# Patient Record
Sex: MALE | Race: WHITE | Employment: OTHER | URBAN - METROPOLITAN AREA
[De-identification: names, ages, dates, MRNs, and addresses within clinical notes are randomized per-mention and may not be internally consistent; named-entity substitution may affect disease eponyms.]

---

## 2017-01-23 PROBLEM — Z82.49 FAMILY HX OF HYPERTENSION: Status: ACTIVE | Noted: 2017-01-23

## 2017-02-07 PROBLEM — R07.89 ATYPICAL CHEST PAIN: Status: ACTIVE | Noted: 2017-02-07

## 2017-02-21 PROBLEM — G58.8 PERIPHERAL NEUROPATHY DUE TO ISCHEMIA: Status: ACTIVE | Noted: 2017-02-21

## 2017-02-21 PROBLEM — G62.89 PERIPHERAL NEUROPATHY DUE TO ISCHEMIA: Status: ACTIVE | Noted: 2017-02-21

## 2018-10-02 ENCOUNTER — HOSPITAL ENCOUNTER (EMERGENCY)
Age: 38
Discharge: HOME | End: 2018-10-02
Payer: COMMERCIAL

## 2018-10-02 ENCOUNTER — APPOINTMENT (OUTPATIENT)
Dept: GENERAL RADIOLOGY | Age: 38
End: 2018-10-02
Payer: COMMERCIAL

## 2018-10-02 VITALS
BODY MASS INDEX: 22.75 KG/M2 | TEMPERATURE: 98.7 F | HEIGHT: 72 IN | WEIGHT: 168 LBS | OXYGEN SATURATION: 98 % | RESPIRATION RATE: 16 BRPM | SYSTOLIC BLOOD PRESSURE: 160 MMHG | DIASTOLIC BLOOD PRESSURE: 70 MMHG | HEART RATE: 76 BPM

## 2018-10-02 DIAGNOSIS — J20.9 ACUTE BRONCHITIS, UNSPECIFIED ORGANISM: Primary | ICD-10-CM

## 2018-10-02 PROCEDURE — 71046 X-RAY EXAM CHEST 2 VIEWS: CPT

## 2018-10-02 PROCEDURE — 99283 EMERGENCY DEPT VISIT LOW MDM: CPT

## 2018-10-02 RX ORDER — GUAIFENESIN AND DEXTROMETHORPHAN HYDROBROMIDE 1200; 60 MG/1; MG/1
1 TABLET, EXTENDED RELEASE ORAL EVERY 12 HOURS PRN
Qty: 14 TABLET | Refills: 0 | Status: SHIPPED | OUTPATIENT
Start: 2018-10-02 | End: 2018-10-09

## 2018-10-02 RX ORDER — METHYLPREDNISOLONE 4 MG/1
TABLET ORAL
Qty: 21 TABLET | Status: SHIPPED | OUTPATIENT
Start: 2018-10-02 | End: 2018-10-08

## 2018-10-02 RX ORDER — ALBUTEROL SULFATE 90 UG/1
2 AEROSOL, METERED RESPIRATORY (INHALATION) EVERY 4 HOURS PRN
Qty: 1 INHALER | Refills: 1 | Status: SHIPPED | OUTPATIENT
Start: 2018-10-02 | End: 2018-10-11

## 2018-10-02 RX ORDER — PSEUDOEPHEDRINE HYDROCHLORIDE 30 MG/1
30 TABLET ORAL EVERY 4 HOURS PRN
Qty: 42 TABLET | Refills: 0 | Status: SHIPPED | OUTPATIENT
Start: 2018-10-02 | End: 2018-10-09

## 2018-10-02 RX ORDER — BENZONATATE 100 MG/1
100 CAPSULE ORAL 3 TIMES DAILY PRN
Qty: 21 CAPSULE | Refills: 0 | Status: SHIPPED | OUTPATIENT
Start: 2018-10-02 | End: 2018-10-09

## 2018-10-02 RX ORDER — GUAIFENESIN 400 MG/1
400 TABLET ORAL 4 TIMES DAILY PRN
COMMUNITY
End: 2018-10-02 | Stop reason: DRUGHIGH

## 2018-10-02 ASSESSMENT — PAIN DESCRIPTION - PAIN TYPE: TYPE: ACUTE PAIN

## 2018-10-02 ASSESSMENT — PAIN DESCRIPTION - DESCRIPTORS: DESCRIPTORS: ACHING

## 2018-10-02 NOTE — ED PROVIDER NOTES
[facility]     Department of Emergency Medicine   ED  Provider Note  Admit Date/RoomTime: 10/2/2018 11:21 AM  ED Room: 17/17   Chief Complaint   URI (X 1 WEEK)    History of Present Illness   Source of history provided by:  patient. History/Exam Limitations: none. Becky Altman is a 41 y.o. old male who has a past medical history of:   Past Medical History:   Diagnosis Date    Arm pain     arm pain and numbness    Hepatitis     Neck pain     Osteoarthritis of spine with radiculopathy, cervical region 3/17/2016    Shoulder pain       presents to the emergency department by private vehicle, with complaints of gradual onset, still present, constant productive cough with sputum described as yellow and green which began 1 week(s) prior to arrival.  The symptoms are associated with chills, night sweats, nasal congestion, sore throat (from coughing) and cough and denies abdominal pain, calf pain, dizziness, leg swelling, palpitations, shortness of breath or syncope. He has prior history of 1 prior episode of pneumonia in the past.  Since onset the symptoms have been persistent and stable and moderate to severe in severity. The symptoms are aggravated by being outside at work and relieved by nothing. ROS   Pertinent positives and negatives are stated within HPI, all other systems reviewed and are negative. History reviewed. No pertinent surgical history. Social History:  reports that he has been smoking Cigarettes. He has been smoking about 1.00 pack per day. He uses smokeless tobacco. He reports that he drinks alcohol. He reports that he does not use drugs. Family History: family history includes Diabetes in his mother; Heart Disease in his father; High Blood Pressure in his mother. Allergies: Patient has no known allergies.     Physical Exam           ED Triage Vitals [10/02/18 1117]   BP Temp Temp Source Pulse Resp SpO2 Height Weight   (!) 160/70 98.7 °F (37.1 °C) Oral -- 16 -- 6' (1.829 m) 168 lb (76.2 kg)      Oxygen Saturation Interpretation: Normal.    Constitutional:  Alert, development consistent with age. HEENT:  NC/NT. Airway patent. Neck:  Normal ROM. Supple. Respiratory:  Breath sounds: equal bilaterally. Lung sounds: normal.   CV:  Regular rate and rhythm, normal heart sounds, without pathological murmurs, ectopy, gallops, or rubs. .  GI:  Abdomen Soft, nontender, good bowel sounds. No firm or pulsatile mass. Integument:  Normal turgor. Warm, dry, without visible rash. Lymphatic:  Edema:  none Bilateral lower extremity(s). Neurological:  Oriented. Motor functions intact. Lab / Imaging Results   (All laboratory and radiology results have been personally reviewed by myself)  Labs:  No results found for this visit on 10/02/18. Imaging: All Radiology results interpreted by Radiologist unless otherwise noted. XR CHEST STANDARD (2 VW)   Final Result   1. No acute cardiopulmonary disease. ED Course / Medical Decision Making   Medications - No data to display     Consult(s):   None    Procedure(s):   none    Medical Decision Making:    Based on low  suspicion for pneumonia as per history/physical findings, imaging was done and confirms the above findings. Upper respiratory infection likely viral in etiology. Not hypoxic, nothing to suggest pneumonia. Patient is well appearing, non toxic and appropriate for outpatient management. Plan is for symptom management with PCP follow up. Plan of Care: All questions answered. Explained the rationale for symptomatic treatment rather than use of an antibiotic. Instruction provided in the use of fluids, vaporizer, acetaminophen, and other OTC medication for symptom control. Extra fluids  Analgesics as needed, dose reviewed. Follow up as needed should symptoms fail to improve. Counseling:     The emergency provider has spoken with the patient and discussed todays results, in addition to providing specific details for the plan of care and counseling regarding the diagnosis and prognosis. Questions are answered at this time and they are agreeable with the plan. Assessment      1. Acute bronchitis, unspecified organism      Plan   Discharge to home  Patient condition is good    New Medications     New Prescriptions    ALBUTEROL SULFATE HFA (PROVENTIL HFA) 108 (90 BASE) MCG/ACT INHALER    Inhale 2 puffs into the lungs every 4 hours as needed for Wheezing    BENZONATATE (TESSALON PERLES) 100 MG CAPSULE    Take 1 capsule by mouth 3 times daily as needed for Cough    DEXTROMETHORPHAN-GUAIFENESIN (MUCINEX DM MAXIMUM STRENGTH)  MG TB12    Take 1 tablet by mouth every 12 hours as needed (cough and congestion)    METHYLPREDNISOLONE (MEDROL, MARSHALL,) 4 MG TABLET    Take as directed on package insert days 1-6    PSEUDOEPHEDRINE (DECONGESTANT) 30 MG TABLET    Take 1 tablet by mouth every 4 hours as needed for Congestion     Electronically signed by David Johnson PA-C   DD: 10/2/18  **This report was transcribed using voice recognition software. Every effort was made to ensure accuracy; however, inadvertent computerized transcription errors may be present.   END OF ED PROVIDER NOTE        David Johnson PA-C  10/02/18 [id number]

## 2018-10-11 ENCOUNTER — OFFICE VISIT (OUTPATIENT)
Dept: FAMILY MEDICINE CLINIC | Age: 38
End: 2018-10-11
Payer: COMMERCIAL

## 2018-10-11 ENCOUNTER — HOSPITAL ENCOUNTER (OUTPATIENT)
Age: 38
Discharge: HOME | End: 2018-10-13
Payer: COMMERCIAL

## 2018-10-11 VITALS
WEIGHT: 162 LBS | OXYGEN SATURATION: 99 % | RESPIRATION RATE: 14 BRPM | HEART RATE: 76 BPM | BODY MASS INDEX: 21.94 KG/M2 | SYSTOLIC BLOOD PRESSURE: 122 MMHG | TEMPERATURE: 98.7 F | HEIGHT: 72 IN | DIASTOLIC BLOOD PRESSURE: 64 MMHG

## 2018-10-11 DIAGNOSIS — J20.9 ACUTE BRONCHITIS, UNSPECIFIED ORGANISM: ICD-10-CM

## 2018-10-11 DIAGNOSIS — Z13.31 DEPRESSION SCREENING NEGATIVE: ICD-10-CM

## 2018-10-11 DIAGNOSIS — R63.4 ABNORMAL WEIGHT LOSS: ICD-10-CM

## 2018-10-11 DIAGNOSIS — J20.9 ACUTE BRONCHITIS, UNSPECIFIED ORGANISM: Primary | ICD-10-CM

## 2018-10-11 DIAGNOSIS — R53.83 FATIGUE, UNSPECIFIED TYPE: ICD-10-CM

## 2018-10-11 LAB
ALBUMIN SERPL-MCNC: 4.4 G/DL (ref 3.5–5.2)
ALP BLD-CCNC: 67 U/L (ref 40–129)
ALT SERPL-CCNC: 17 U/L (ref 0–40)
ANION GAP SERPL CALCULATED.3IONS-SCNC: 18 MMOL/L (ref 7–16)
AST SERPL-CCNC: 19 U/L (ref 0–39)
BASOPHILS ABSOLUTE: 0.12 E9/L (ref 0–0.2)
BASOPHILS RELATIVE PERCENT: 1.2 % (ref 0–2)
BILIRUB SERPL-MCNC: 0.4 MG/DL (ref 0–1.2)
BUN BLDV-MCNC: 19 MG/DL (ref 6–20)
CALCIUM SERPL-MCNC: 9.3 MG/DL (ref 8.6–10.2)
CHLORIDE BLD-SCNC: 97 MMOL/L (ref 98–107)
CHOLESTEROL, TOTAL: 123 MG/DL (ref 0–199)
CO2: 23 MMOL/L (ref 22–29)
CREAT SERPL-MCNC: 0.8 MG/DL (ref 0.7–1.2)
EOSINOPHILS ABSOLUTE: 0.17 E9/L (ref 0.05–0.5)
EOSINOPHILS RELATIVE PERCENT: 1.8 % (ref 0–6)
GFR AFRICAN AMERICAN: >60
GFR NON-AFRICAN AMERICAN: >60 ML/MIN/1.73
GLUCOSE BLD-MCNC: 71 MG/DL (ref 74–109)
HCT VFR BLD CALC: 47.2 % (ref 37–54)
HDLC SERPL-MCNC: 32 MG/DL
HEMOGLOBIN: 15.1 G/DL (ref 12.5–16.5)
IMMATURE GRANULOCYTES #: 0.04 E9/L
IMMATURE GRANULOCYTES %: 0.4 % (ref 0–5)
LDL CHOLESTEROL CALCULATED: 78 MG/DL (ref 0–99)
LYMPHOCYTES ABSOLUTE: 1.91 E9/L (ref 1.5–4)
LYMPHOCYTES RELATIVE PERCENT: 19.9 % (ref 20–42)
MCH RBC QN AUTO: 29.1 PG (ref 26–35)
MCHC RBC AUTO-ENTMCNC: 32 % (ref 32–34.5)
MCV RBC AUTO: 90.9 FL (ref 80–99.9)
MONOCYTES ABSOLUTE: 0.7 E9/L (ref 0.1–0.95)
MONOCYTES RELATIVE PERCENT: 7.3 % (ref 2–12)
NEUTROPHILS ABSOLUTE: 6.68 E9/L (ref 1.8–7.3)
NEUTROPHILS RELATIVE PERCENT: 69.4 % (ref 43–80)
PDW BLD-RTO: 12.8 FL (ref 11.5–15)
PLATELET # BLD: 369 E9/L (ref 130–450)
PMV BLD AUTO: 10.5 FL (ref 7–12)
POTASSIUM SERPL-SCNC: 4.4 MMOL/L (ref 3.5–5)
RBC # BLD: 5.19 E12/L (ref 3.8–5.8)
SODIUM BLD-SCNC: 138 MMOL/L (ref 132–146)
T4 FREE: 1.34 NG/DL (ref 0.93–1.7)
TOTAL PROTEIN: 7.5 G/DL (ref 6.4–8.3)
TRIGL SERPL-MCNC: 64 MG/DL (ref 0–149)
TSH SERPL DL<=0.05 MIU/L-ACNC: 4.91 UIU/ML (ref 0.27–4.2)
VITAMIN D 25-HYDROXY: 55 NG/ML (ref 30–100)
VLDLC SERPL CALC-MCNC: 13 MG/DL
WBC # BLD: 9.6 E9/L (ref 4.5–11.5)

## 2018-10-11 PROCEDURE — G8484 FLU IMMUNIZE NO ADMIN: HCPCS | Performed by: NURSE PRACTITIONER

## 2018-10-11 PROCEDURE — 99214 OFFICE O/P EST MOD 30 MIN: CPT | Performed by: NURSE PRACTITIONER

## 2018-10-11 PROCEDURE — 85025 COMPLETE CBC W/AUTO DIFF WBC: CPT

## 2018-10-11 PROCEDURE — 84443 ASSAY THYROID STIM HORMONE: CPT

## 2018-10-11 PROCEDURE — 80061 LIPID PANEL: CPT

## 2018-10-11 PROCEDURE — G8427 DOCREV CUR MEDS BY ELIG CLIN: HCPCS | Performed by: NURSE PRACTITIONER

## 2018-10-11 PROCEDURE — 4004F PT TOBACCO SCREEN RCVD TLK: CPT | Performed by: NURSE PRACTITIONER

## 2018-10-11 PROCEDURE — 82306 VITAMIN D 25 HYDROXY: CPT

## 2018-10-11 PROCEDURE — G8420 CALC BMI NORM PARAMETERS: HCPCS | Performed by: NURSE PRACTITIONER

## 2018-10-11 PROCEDURE — 80053 COMPREHEN METABOLIC PANEL: CPT

## 2018-10-11 PROCEDURE — 84439 ASSAY OF FREE THYROXINE: CPT

## 2018-10-11 ASSESSMENT — ENCOUNTER SYMPTOMS
DIARRHEA: 0
VOICE CHANGE: 0
NAUSEA: 0
VOMITING: 0
SHORTNESS OF BREATH: 0
COLOR CHANGE: 0
CONSTIPATION: 0
SINUS PRESSURE: 0
ABDOMINAL PAIN: 0
FACIAL SWELLING: 0
CHEST TIGHTNESS: 0
SORE THROAT: 1
BACK PAIN: 0
RHINORRHEA: 0
SINUS PAIN: 0
TROUBLE SWALLOWING: 0
WHEEZING: 0
COUGH: 1

## 2018-10-11 ASSESSMENT — PATIENT HEALTH QUESTIONNAIRE - PHQ9
2. FEELING DOWN, DEPRESSED OR HOPELESS: 0
SUM OF ALL RESPONSES TO PHQ QUESTIONS 1-9: 0
SUM OF ALL RESPONSES TO PHQ QUESTIONS 1-9: 0
1. LITTLE INTEREST OR PLEASURE IN DOING THINGS: 0
SUM OF ALL RESPONSES TO PHQ9 QUESTIONS 1 & 2: 0

## 2018-10-11 NOTE — PROGRESS NOTES
immunocompromised state. Neurological: Negative for dizziness, tremors, seizures, syncope, facial asymmetry, speech difficulty, weakness, light-headedness, numbness and headaches. Hematological: Negative for adenopathy. Does not bruise/bleed easily. Psychiatric/Behavioral: Negative for agitation, behavioral problems, confusion, decreased concentration, dysphoric mood, hallucinations, self-injury, sleep disturbance and suicidal ideas. The patient is not nervous/anxious and is not hyperactive. OBJECTIVE:     VS:  Wt Readings from Last 3 Encounters:   10/11/18 162 lb (73.5 kg)   10/02/18 168 lb (76.2 kg)   08/17/17 172 lb (78 kg)                       Vitals:    10/11/18 0816   BP: 122/64   Pulse: 76   Resp: 14   Temp: 98.7 °F (37.1 °C)   TempSrc: Temporal   SpO2: 99%   Weight: 162 lb (73.5 kg)   Height: 6' (1.829 m)       General: Alert and oriented to person, place, and time, well developed and well nourished, in no acute distress  SKIN: Warm and dry, intact without any rash, masses or lesions, multiple tattoes  HEAD: normocephalic, atraumatic  Eyes: sclera/conjunctiva clear, PERRLA, EOMI's intact  ENT: tympanic membranes, external ear and ear canal normal bilaterally, normal hearing, Nose without deformity, nasal mucosa and turbinates normal without polyps   Throat: clear, tongue midline, tonsils 1+, drainage, no masses or lesions noted, good dentition  Neck: supple and non-tender without mass, trachea midline, no cervical lymphadenopathy, no bruit, no thyromegaly or nodules  Cardiovascular: regular rate and regular rhythm, normal S1 and S2,  no murmurs, rubs, clicks, or gallop. Distal pulses intact, no carotid bruits.  No edema  Pulmonary/Chest: clear to auscultation bilaterally, no wheezes, rales or rhonchi, normal air movement, no respiratory distress  Abdomen: soft, non-tender, non-distended, normal bowel sounds, no masses or hepatosplenomegaly  Musculoskeletal: Normal ROM, no joint swelling, deformity or tenderness   Neurologic: reflexes normal and symmetric, no cranial nerve deficit, gait, coordination and speech normal  Extremities: no clubbing, cyanosis, or edema. Psychiatric: Good eye contact, normal mood and affect, answers questions appropriately    ASSESSMENT/PLAN   Deuce Leary was seen today for cough, chest congestion, fatigue, weight loss, stress and other. Diagnoses and all orders for this visit:    Acute bronchitis, unspecified organism stable  -     CBC Auto Differential; Future  -     Comprehensive Metabolic Panel; Future    Fatigue, unspecified type New  -     CBC Auto Differential; Future  -     Comprehensive Metabolic Panel; Future  -     Lipid Panel; Future  -     Vitamin D 25 Hydrox, D2 & D3; Future  -     TSH without Reflex; Future  -     T4, Free; Future    Abnormal weight loss New  -     CBC Auto Differential; Future  -     Comprehensive Metabolic Panel; Future  -     TSH without Reflex; Future  -     T4, Free; Future    Depression screening negative            Return in about 1 week (around 10/18/2018) for fatigue, weight loss. Discussed reducing caffeine intake, Discussed smoking cessation, Discussed exercising 30 minutes daily and Discussed taking medications as directed and adverse effects        I have reviewed my findings and recommendations with Noah Fu.     Virginia Clifford, NP-C, FNP-BC

## 2020-02-25 ENCOUNTER — HOSPITAL ENCOUNTER (EMERGENCY)
Age: 40
Discharge: HOME | End: 2020-02-25

## 2020-02-25 ENCOUNTER — APPOINTMENT (OUTPATIENT)
Dept: GENERAL RADIOLOGY | Age: 40
End: 2020-02-25

## 2020-02-25 VITALS
BODY MASS INDEX: 21.67 KG/M2 | SYSTOLIC BLOOD PRESSURE: 148 MMHG | RESPIRATION RATE: 20 BRPM | WEIGHT: 160 LBS | OXYGEN SATURATION: 100 % | HEART RATE: 79 BPM | TEMPERATURE: 98.8 F | HEIGHT: 72 IN | DIASTOLIC BLOOD PRESSURE: 83 MMHG

## 2020-02-25 LAB
ANION GAP SERPL CALCULATED.3IONS-SCNC: 12 MMOL/L (ref 7–16)
BASOPHILS ABSOLUTE: 0.12 E9/L (ref 0–0.2)
BASOPHILS RELATIVE PERCENT: 1.5 % (ref 0–2)
BILIRUBIN URINE: NEGATIVE
BLOOD, URINE: NEGATIVE
BUN BLDV-MCNC: 17 MG/DL (ref 6–20)
CALCIUM SERPL-MCNC: 9.8 MG/DL (ref 8.6–10.2)
CHLORIDE BLD-SCNC: 100 MMOL/L (ref 98–107)
CLARITY: CLEAR
CO2: 30 MMOL/L (ref 22–29)
COLOR: YELLOW
CREAT SERPL-MCNC: 0.8 MG/DL (ref 0.7–1.2)
D DIMER: <200 NG/ML DDU
EOSINOPHILS ABSOLUTE: 0.22 E9/L (ref 0.05–0.5)
EOSINOPHILS RELATIVE PERCENT: 2.7 % (ref 0–6)
GFR AFRICAN AMERICAN: >60
GFR NON-AFRICAN AMERICAN: >60 ML/MIN/1.73
GLUCOSE BLD-MCNC: 91 MG/DL (ref 74–99)
GLUCOSE URINE: NEGATIVE MG/DL
HCT VFR BLD CALC: 46.1 % (ref 37–54)
HEMOGLOBIN: 15.9 G/DL (ref 12.5–16.5)
IMMATURE GRANULOCYTES #: 0.03 E9/L
IMMATURE GRANULOCYTES %: 0.4 % (ref 0–5)
KETONES, URINE: NEGATIVE MG/DL
LEUKOCYTE ESTERASE, URINE: NEGATIVE
LYMPHOCYTES ABSOLUTE: 2.07 E9/L (ref 1.5–4)
LYMPHOCYTES RELATIVE PERCENT: 25.8 % (ref 20–42)
MCH RBC QN AUTO: 30.8 PG (ref 26–35)
MCHC RBC AUTO-ENTMCNC: 34.5 % (ref 32–34.5)
MCV RBC AUTO: 89.3 FL (ref 80–99.9)
MONOCYTES ABSOLUTE: 0.48 E9/L (ref 0.1–0.95)
MONOCYTES RELATIVE PERCENT: 6 % (ref 2–12)
NEUTROPHILS ABSOLUTE: 5.09 E9/L (ref 1.8–7.3)
NEUTROPHILS RELATIVE PERCENT: 63.6 % (ref 43–80)
NITRITE, URINE: NEGATIVE
PDW BLD-RTO: 12.5 FL (ref 11.5–15)
PH UA: 7 (ref 5–9)
PLATELET # BLD: 336 E9/L (ref 130–450)
PMV BLD AUTO: 10.1 FL (ref 7–12)
POTASSIUM REFLEX MAGNESIUM: 4.3 MMOL/L (ref 3.5–5)
PROTEIN UA: NEGATIVE MG/DL
RBC # BLD: 5.16 E12/L (ref 3.8–5.8)
SODIUM BLD-SCNC: 142 MMOL/L (ref 132–146)
SPECIFIC GRAVITY UA: 1.02 (ref 1–1.03)
TROPONIN: <0.01 NG/ML (ref 0–0.03)
UROBILINOGEN, URINE: 0.2 E.U./DL
WBC # BLD: 8 E9/L (ref 4.5–11.5)

## 2020-02-25 PROCEDURE — 93005 ELECTROCARDIOGRAM TRACING: CPT | Performed by: PHYSICIAN ASSISTANT

## 2020-02-25 PROCEDURE — 6370000000 HC RX 637 (ALT 250 FOR IP): Performed by: PHYSICIAN ASSISTANT

## 2020-02-25 PROCEDURE — 6360000002 HC RX W HCPCS: Performed by: PHYSICIAN ASSISTANT

## 2020-02-25 PROCEDURE — 80048 BASIC METABOLIC PNL TOTAL CA: CPT

## 2020-02-25 PROCEDURE — 85025 COMPLETE CBC W/AUTO DIFF WBC: CPT

## 2020-02-25 PROCEDURE — 71046 X-RAY EXAM CHEST 2 VIEWS: CPT

## 2020-02-25 PROCEDURE — 96374 THER/PROPH/DIAG INJ IV PUSH: CPT

## 2020-02-25 PROCEDURE — 81003 URINALYSIS AUTO W/O SCOPE: CPT

## 2020-02-25 PROCEDURE — 36415 COLL VENOUS BLD VENIPUNCTURE: CPT

## 2020-02-25 PROCEDURE — 85378 FIBRIN DEGRADE SEMIQUANT: CPT

## 2020-02-25 PROCEDURE — 84484 ASSAY OF TROPONIN QUANT: CPT

## 2020-02-25 PROCEDURE — 99284 EMERGENCY DEPT VISIT MOD MDM: CPT

## 2020-02-25 RX ORDER — TRAMADOL HYDROCHLORIDE 50 MG/1
50 TABLET ORAL EVERY 6 HOURS PRN
COMMUNITY
End: 2022-11-02

## 2020-02-25 RX ORDER — CYCLOBENZAPRINE HCL 10 MG
10 TABLET ORAL 3 TIMES DAILY PRN
Qty: 21 TABLET | Refills: 0 | Status: SHIPPED | OUTPATIENT
Start: 2020-02-25 | End: 2020-03-06

## 2020-02-25 RX ORDER — CYCLOBENZAPRINE HCL 10 MG
10 TABLET ORAL ONCE
Status: DISCONTINUED | OUTPATIENT
Start: 2020-02-25 | End: 2020-02-25

## 2020-02-25 RX ORDER — TRAMADOL HYDROCHLORIDE 50 MG/1
50 TABLET ORAL ONCE
Status: COMPLETED | OUTPATIENT
Start: 2020-02-25 | End: 2020-02-25

## 2020-02-25 RX ORDER — KETOROLAC TROMETHAMINE 15 MG/ML
15 INJECTION, SOLUTION INTRAMUSCULAR; INTRAVENOUS ONCE
Status: COMPLETED | OUTPATIENT
Start: 2020-02-25 | End: 2020-02-25

## 2020-02-25 RX ORDER — LIDOCAINE 50 MG/G
1 PATCH TOPICAL EVERY 24 HOURS
Qty: 10 PATCH | Refills: 0 | Status: SHIPPED | OUTPATIENT
Start: 2020-02-25 | End: 2020-03-06

## 2020-02-25 RX ADMIN — KETOROLAC TROMETHAMINE 15 MG: 15 INJECTION, SOLUTION INTRAMUSCULAR; INTRAVENOUS at 11:33

## 2020-02-25 RX ADMIN — TRAMADOL HYDROCHLORIDE 50 MG: 50 TABLET, FILM COATED ORAL at 11:33

## 2020-02-25 ASSESSMENT — PAIN DESCRIPTION - PAIN TYPE: TYPE: ACUTE PAIN

## 2020-02-25 ASSESSMENT — ENCOUNTER SYMPTOMS
SHORTNESS OF BREATH: 1
DIARRHEA: 0
CHEST TIGHTNESS: 0
COUGH: 0
VOMITING: 0
BACK PAIN: 1
NAUSEA: 0
ABDOMINAL PAIN: 1
CONSTIPATION: 0
COLOR CHANGE: 0

## 2020-02-25 ASSESSMENT — PAIN DESCRIPTION - ORIENTATION: ORIENTATION: LEFT

## 2020-02-25 ASSESSMENT — PAIN SCALES - GENERAL
PAINLEVEL_OUTOF10: 8
PAINLEVEL_OUTOF10: 8

## 2020-02-25 ASSESSMENT — PAIN DESCRIPTION - LOCATION: LOCATION: BACK;RIB CAGE

## 2020-02-25 NOTE — ED PROVIDER NOTES
[facility]        Department of Emergency Medicine   ED  Provider Note  Admit Date/RoomTime: 2/25/2020 10:00 AM  ED Room: 25/25  HPI:  2/25/20, Time: 10:24 AM      81year-old male presenting to the emergency department with left upper back pain that is radiating into his left flank and around to the front of his abdomen. It is worse with inspiration. It is not worse with movement. Patient states this is different than his chronic back pain. He states \"I dont think its my muscles, it has to be my internal organs. \"  Patient has been taking his tramadol and ibuprofen as prescribed and states the pain is been getting worse. He denies any trauma or injury. He feels like it is now hard to breathe. He has no history of blood clots, recent travel or surgery. He denies any dysuria, hematuria, nausea/vomiting, dizziness, chest pain, leg pain or swelling. The history is provided by the patient. No  was used. REVIEW OF SYSTEMS:  Review of Systems   Constitutional: Negative for activity change, chills, fatigue and fever. Respiratory: Positive for shortness of breath. Negative for cough and chest tightness. Cardiovascular: Negative for chest pain, palpitations and leg swelling. Gastrointestinal: Positive for abdominal pain. Negative for constipation, diarrhea, nausea and vomiting. Genitourinary: Positive for flank pain. Negative for dysuria, frequency and hematuria. Musculoskeletal: Positive for back pain. Negative for neck pain and neck stiffness. Skin: Negative for color change, pallor and rash. Neurological: Negative for dizziness, light-headedness and headaches. Psychiatric/Behavioral: Negative for agitation, behavioral problems and confusion.       Pertinent positives and negatives are stated within HPI, all other systems reviewed and are negative.      --------------------------------------------- PAST HISTORY ---------------------------------------------  Past Medical History:  has a past medical history of Arm pain, Hepatitis, Neck pain, Osteoarthritis of spine with radiculopathy, cervical region, and Shoulder pain. Past Surgical History:  has no past surgical history on file. Social History:  reports that he has been smoking cigarettes. He has been smoking about 1.00 pack per day. He has quit using smokeless tobacco. He reports current alcohol use. He reports that he does not use drugs. Family History: family history includes Diabetes in his mother; Heart Disease in his father; High Blood Pressure in his mother. The patients home medications have been reviewed. Allergies: Patient has no known allergies.     -------------------------------------------------- RESULTS -------------------------------------------------  All laboratory and radiology results have been personally reviewed by myself   LABS:  Results for orders placed or performed during the hospital encounter of 89/97/62   Basic Metabolic Panel w/ Reflex to MG   Result Value Ref Range    Sodium 142 132 - 146 mmol/L    Potassium reflex Magnesium 4.3 3.5 - 5.0 mmol/L    Chloride 100 98 - 107 mmol/L    CO2 30 (H) 22 - 29 mmol/L    Anion Gap 12 7 - 16 mmol/L    Glucose 91 74 - 99 mg/dL    BUN 17 6 - 20 mg/dL    CREATININE 0.8 0.7 - 1.2 mg/dL    GFR Non-African American >60 >=60 mL/min/1.73    GFR African American >60     Calcium 9.8 8.6 - 10.2 mg/dL   CBC Auto Differential   Result Value Ref Range    WBC 8.0 4.5 - 11.5 E9/L    RBC 5.16 3.80 - 5.80 E12/L    Hemoglobin 15.9 12.5 - 16.5 g/dL    Hematocrit 46.1 37.0 - 54.0 %    MCV 89.3 80.0 - 99.9 fL    MCH 30.8 26.0 - 35.0 pg    MCHC 34.5 32.0 - 34.5 %    RDW 12.5 11.5 - 15.0 fL    Platelets 984 085 - 393 E9/L    MPV 10.1 7.0 - 12.0 fL    Neutrophils % 63.6 43.0 - 80.0 %    Immature Granulocytes % 0.4 0.0 - 5.0 %    Lymphocytes % 25.8 20.0 - 42.0 %    Monocytes % 6.0 2.0 - 12.0 %    Eosinophils % 2.7 0.0 - 6.0 %    Basophils % 1.5 0.0 - 2.0 % Neutrophils Absolute 5.09 1.80 - 7.30 E9/L    Immature Granulocytes # 0.03 E9/L    Lymphocytes Absolute 2.07 1.50 - 4.00 E9/L    Monocytes Absolute 0.48 0.10 - 0.95 E9/L    Eosinophils Absolute 0.22 0.05 - 0.50 E9/L    Basophils Absolute 0.12 0.00 - 0.20 E9/L   Troponin   Result Value Ref Range    Troponin <0.01 0.00 - 0.03 ng/mL   Urinalysis, reflex to microscopic   Result Value Ref Range    Color, UA Yellow Straw/Yellow    Clarity, UA Clear Clear    Glucose, Ur Negative Negative mg/dL    Bilirubin Urine Negative Negative    Ketones, Urine Negative Negative mg/dL    Specific Gravity, UA 1.020 1.005 - 1.030    Blood, Urine Negative Negative    pH, UA 7.0 5.0 - 9.0    Protein, UA Negative Negative mg/dL    Urobilinogen, Urine 0.2 <2.0 E.U./dL    Nitrite, Urine Negative Negative    Leukocyte Esterase, Urine Negative Negative   D-Dimer, Quantitative   Result Value Ref Range    D-Dimer, Quant <200 ng/mL DDU   EKG 12 Lead   Result Value Ref Range    Ventricular Rate 69 BPM    Atrial Rate 69 BPM    P-R Interval 146 ms    QRS Duration 102 ms    Q-T Interval 388 ms    QTc Calculation (Bazett) 415 ms    P Axis 65 degrees    R Axis 82 degrees    T Axis 65 degrees       RADIOLOGY:  Interpreted by Radiologist.  XR CHEST STANDARD (2 VW)   Final Result   No acute cardiopulmonary disease.             ------------------------- NURSING NOTES AND VITALS REVIEWED ---------------------------   The nursing notes within the ED encounter and vital signs as below have been reviewed. BP (!) 148/83   Pulse 79   Temp 98.8 °F (37.1 °C) (Oral)   Resp 20   Ht 6' (1.829 m)   Wt 160 lb (72.6 kg)   SpO2 100%   BMI 21.70 kg/m²   Oxygen Saturation Interpretation: Normal      ---------------------------------------------------PHYSICAL EXAM--------------------------------------    Physical Exam  Vitals signs and nursing note reviewed. Constitutional:       General: He is not in acute distress. Appearance: Normal appearance.  He is has no risk factors for a PE other than the pain is very pleuritic in nature. Labs including a d-dimer and troponin are negative. Chest x-ray shows no acute abnormality. Patient was given Toradol and tramadol for his pain. We discussed that this is likely pleurisy. Patient admits to vaping daily which could be an aggravating factor. Encouraged to follow-up with his primary care physician if his symptoms or not improving over the next week. Patient discharged home in good condition. Counseling: The emergency provider has spoken with the patient and discussed todays results, in addition to providing specific details for the plan of care and counseling regarding the diagnosis and prognosis. Questions are answered at this time and they are agreeable with the plan.      --------------------------------- IMPRESSION AND DISPOSITION ---------------------------------    IMPRESSION  1. Upper back pain        DISPOSITION  Disposition: Discharge to home  Patient condition is good      Electronically signed by Lashaun Dwyer PA-C   DD: 2/25/20  **This report was transcribed using voice recognition software. Every effort was made to ensure accuracy; however, inadvertent computerized transcription errors may be present.   END OF ED PROVIDER NOTE         Lashaun Dwyer PA-C  02/25/20 1855

## 2020-02-26 LAB
EKG ATRIAL RATE: 69 BPM
EKG P AXIS: 65 DEGREES
EKG P-R INTERVAL: 146 MS
EKG Q-T INTERVAL: 388 MS
EKG QRS DURATION: 102 MS
EKG QTC CALCULATION (BAZETT): 415 MS
EKG R AXIS: 82 DEGREES
EKG T AXIS: 65 DEGREES
EKG VENTRICULAR RATE: 69 BPM

## 2020-02-26 PROCEDURE — 93010 ELECTROCARDIOGRAM REPORT: CPT | Performed by: INTERNAL MEDICINE

## 2021-07-05 ENCOUNTER — HOSPITAL ENCOUNTER (EMERGENCY)
Age: 41
Discharge: HOME | End: 2021-07-05
Attending: EMERGENCY MEDICINE

## 2021-07-05 VITALS
TEMPERATURE: 97.2 F | SYSTOLIC BLOOD PRESSURE: 125 MMHG | HEART RATE: 75 BPM | BODY MASS INDEX: 22.35 KG/M2 | OXYGEN SATURATION: 99 % | DIASTOLIC BLOOD PRESSURE: 76 MMHG | HEIGHT: 72 IN | WEIGHT: 165 LBS | RESPIRATION RATE: 18 BRPM

## 2021-07-05 DIAGNOSIS — L03.90 CELLULITIS, UNSPECIFIED CELLULITIS SITE: ICD-10-CM

## 2021-07-05 DIAGNOSIS — R21 RASH AND OTHER NONSPECIFIC SKIN ERUPTION: Primary | ICD-10-CM

## 2021-07-05 LAB
ALBUMIN SERPL-MCNC: 4.2 G/DL (ref 3.5–5.2)
ALP BLD-CCNC: 64 U/L (ref 40–129)
ALT SERPL-CCNC: 16 U/L (ref 0–40)
ANION GAP SERPL CALCULATED.3IONS-SCNC: 10 MMOL/L (ref 7–16)
AST SERPL-CCNC: 20 U/L (ref 0–39)
BASOPHILS ABSOLUTE: 0.08 E9/L (ref 0–0.2)
BASOPHILS RELATIVE PERCENT: 1.4 % (ref 0–2)
BILIRUB SERPL-MCNC: 0.2 MG/DL (ref 0–1.2)
BUN BLDV-MCNC: 15 MG/DL (ref 6–20)
CALCIUM SERPL-MCNC: 9.2 MG/DL (ref 8.6–10.2)
CHLORIDE BLD-SCNC: 103 MMOL/L (ref 98–107)
CO2: 26 MMOL/L (ref 22–29)
CREAT SERPL-MCNC: 1 MG/DL (ref 0.7–1.2)
EOSINOPHILS ABSOLUTE: 0.11 E9/L (ref 0.05–0.5)
EOSINOPHILS RELATIVE PERCENT: 2 % (ref 0–6)
GFR AFRICAN AMERICAN: >60
GFR NON-AFRICAN AMERICAN: >60 ML/MIN/1.73
GLUCOSE BLD-MCNC: 134 MG/DL (ref 74–99)
HCT VFR BLD CALC: 41 % (ref 37–54)
HEMOGLOBIN: 14.4 G/DL (ref 12.5–16.5)
IMMATURE GRANULOCYTES #: 0.01 E9/L
IMMATURE GRANULOCYTES %: 0.2 % (ref 0–5)
LACTIC ACID: 1.1 MMOL/L (ref 0.5–2.2)
LYMPHOCYTES ABSOLUTE: 1.54 E9/L (ref 1.5–4)
LYMPHOCYTES RELATIVE PERCENT: 27.3 % (ref 20–42)
MCH RBC QN AUTO: 30.4 PG (ref 26–35)
MCHC RBC AUTO-ENTMCNC: 35.1 % (ref 32–34.5)
MCV RBC AUTO: 86.7 FL (ref 80–99.9)
MONOCYTES ABSOLUTE: 0.36 E9/L (ref 0.1–0.95)
MONOCYTES RELATIVE PERCENT: 6.4 % (ref 2–12)
NEUTROPHILS ABSOLUTE: 3.54 E9/L (ref 1.8–7.3)
NEUTROPHILS RELATIVE PERCENT: 62.7 % (ref 43–80)
PDW BLD-RTO: 11.9 FL (ref 11.5–15)
PLATELET # BLD: 258 E9/L (ref 130–450)
PMV BLD AUTO: 10.1 FL (ref 7–12)
POTASSIUM REFLEX MAGNESIUM: 3.9 MMOL/L (ref 3.5–5)
RBC # BLD: 4.73 E12/L (ref 3.8–5.8)
SODIUM BLD-SCNC: 139 MMOL/L (ref 132–146)
TOTAL PROTEIN: 6.6 G/DL (ref 6.4–8.3)
WBC # BLD: 5.6 E9/L (ref 4.5–11.5)

## 2021-07-05 PROCEDURE — 86618 LYME DISEASE ANTIBODY: CPT

## 2021-07-05 PROCEDURE — 99283 EMERGENCY DEPT VISIT LOW MDM: CPT

## 2021-07-05 PROCEDURE — 85025 COMPLETE CBC W/AUTO DIFF WBC: CPT

## 2021-07-05 PROCEDURE — 6370000000 HC RX 637 (ALT 250 FOR IP): Performed by: PHYSICIAN ASSISTANT

## 2021-07-05 PROCEDURE — 80053 COMPREHEN METABOLIC PANEL: CPT

## 2021-07-05 PROCEDURE — 87040 BLOOD CULTURE FOR BACTERIA: CPT

## 2021-07-05 PROCEDURE — 83605 ASSAY OF LACTIC ACID: CPT

## 2021-07-05 RX ORDER — DOXYCYCLINE HYCLATE 100 MG
100 TABLET ORAL 2 TIMES DAILY
Qty: 28 TABLET | Refills: 0 | Status: SHIPPED | OUTPATIENT
Start: 2021-07-05 | End: 2021-07-19

## 2021-07-05 RX ORDER — IBUPROFEN 800 MG/1
800 TABLET ORAL EVERY 8 HOURS PRN
COMMUNITY
End: 2022-04-17

## 2021-07-05 RX ORDER — DOXYCYCLINE HYCLATE 100 MG/1
100 CAPSULE ORAL ONCE
Status: COMPLETED | OUTPATIENT
Start: 2021-07-05 | End: 2021-07-05

## 2021-07-05 RX ADMIN — DOXYCYCLINE HYCLATE 100 MG: 100 CAPSULE ORAL at 19:30

## 2021-07-05 ASSESSMENT — PAIN SCALES - GENERAL: PAINLEVEL_OUTOF10: 6

## 2021-07-05 ASSESSMENT — PAIN DESCRIPTION - ONSET: ONSET: GRADUAL

## 2021-07-05 ASSESSMENT — PAIN DESCRIPTION - DESCRIPTORS: DESCRIPTORS: BURNING

## 2021-07-05 ASSESSMENT — PAIN DESCRIPTION - LOCATION: LOCATION: ARM

## 2021-07-05 ASSESSMENT — PAIN DESCRIPTION - FREQUENCY: FREQUENCY: CONTINUOUS

## 2021-07-05 ASSESSMENT — PAIN - FUNCTIONAL ASSESSMENT: PAIN_FUNCTIONAL_ASSESSMENT: ACTIVITIES ARE NOT PREVENTED

## 2021-07-05 ASSESSMENT — PAIN DESCRIPTION - PROGRESSION: CLINICAL_PROGRESSION: GRADUALLY WORSENING

## 2021-07-05 ASSESSMENT — PAIN DESCRIPTION - ORIENTATION: ORIENTATION: LEFT

## 2021-07-05 ASSESSMENT — PAIN DESCRIPTION - PAIN TYPE: TYPE: ACUTE PAIN

## 2021-07-05 NOTE — ED PROVIDER NOTES
ED Attending  CC: No  HPI:  7/5/21, Time: 5:51 PM EDT         Yulia Garcia is a 37 y.o. male presenting to the ED for rash, beginning 2 weeks ago. The complaint has been persistent, moderate in severity, and worsened by nothing. patient comes in with complaint of rash to the left arm. He states the rash started approximately 2 weeks ago he believes he may have been bit by an insect. Over the last week he has had some generalized fatigue denies any fever chills states he has some joint pain of the wrist bilaterally. Patient denies any history of diabetes. Rash is tender to the touch of the skin, no deep tenderness noted. Today he noted increasing erythema on the lateral neck chest wall. Patient does work outside denies any recent sunburns to the area. He does work closely with dogs. Review of Systems:   A complete review of systems was performed and pertinent positives and negatives are stated within HPI, all other systems reviewed and are negative.          --------------------------------------------- PAST HISTORY ---------------------------------------------  Past Medical History:  has a past medical history of Arm pain, Hepatitis, Neck pain, Osteoarthritis of spine with radiculopathy, cervical region, and Shoulder pain. Past Surgical History:  has no past surgical history on file. Social History:  reports that he has been smoking cigarettes. He has been smoking about 1.00 pack per day. He has quit using smokeless tobacco. He reports current alcohol use. He reports current drug use. Drug: Marijuana. Family History: family history includes Diabetes in his mother; Heart Disease in his father; High Blood Pressure in his mother. The patients home medications have been reviewed. Allergies: Patient has no known allergies.     -------------------------------------------------- RESULTS -------------------------------------------------  All laboratory and radiology results have been personally reviewed by myself   LABS:  Results for orders placed or performed during the hospital encounter of 07/05/21   CBC Auto Differential   Result Value Ref Range    WBC 5.6 4.5 - 11.5 E9/L    RBC 4.73 3.80 - 5.80 E12/L    Hemoglobin 14.4 12.5 - 16.5 g/dL    Hematocrit 41.0 37.0 - 54.0 %    MCV 86.7 80.0 - 99.9 fL    MCH 30.4 26.0 - 35.0 pg    MCHC 35.1 (H) 32.0 - 34.5 %    RDW 11.9 11.5 - 15.0 fL    Platelets 002 982 - 409 E9/L    MPV 10.1 7.0 - 12.0 fL    Neutrophils % 62.7 43.0 - 80.0 %    Immature Granulocytes % 0.2 0.0 - 5.0 %    Lymphocytes % 27.3 20.0 - 42.0 %    Monocytes % 6.4 2.0 - 12.0 %    Eosinophils % 2.0 0.0 - 6.0 %    Basophils % 1.4 0.0 - 2.0 %    Neutrophils Absolute 3.54 1.80 - 7.30 E9/L    Immature Granulocytes # 0.01 E9/L    Lymphocytes Absolute 1.54 1.50 - 4.00 E9/L    Monocytes Absolute 0.36 0.10 - 0.95 E9/L    Eosinophils Absolute 0.11 0.05 - 0.50 E9/L    Basophils Absolute 0.08 0.00 - 0.20 E9/L   Comprehensive Metabolic Panel w/ Reflex to MG   Result Value Ref Range    Sodium 139 132 - 146 mmol/L    Potassium reflex Magnesium 3.9 3.5 - 5.0 mmol/L    Chloride 103 98 - 107 mmol/L    CO2 26 22 - 29 mmol/L    Anion Gap 10 7 - 16 mmol/L    Glucose 134 (H) 74 - 99 mg/dL    BUN 15 6 - 20 mg/dL    CREATININE 1.0 0.7 - 1.2 mg/dL    GFR Non-African American >60 >=60 mL/min/1.73    GFR African American >60     Calcium 9.2 8.6 - 10.2 mg/dL    Total Protein 6.6 6.4 - 8.3 g/dL    Albumin 4.2 3.5 - 5.2 g/dL    Total Bilirubin 0.2 0.0 - 1.2 mg/dL    Alkaline Phosphatase 64 40 - 129 U/L    ALT 16 0 - 40 U/L    AST 20 0 - 39 U/L   Lactic Acid, Plasma   Result Value Ref Range    Lactic Acid 1.1 0.5 - 2.2 mmol/L       RADIOLOGY:  Interpreted by Radiologist.  No orders to display       ------------------------- NURSING NOTES AND VITALS REVIEWED ---------------------------   The nursing notes within the ED encounter and vital signs as below have been reviewed.    /76   Pulse 75   Temp 97.2 °F (36.2 °C) (Temporal)   Resp 18   Ht 6' (1.829 m)   Wt 165 lb (74.8 kg)   SpO2 99%   BMI 22.38 kg/m²   Oxygen Saturation Interpretation: Normal      ---------------------------------------------------PHYSICAL EXAM--------------------------------------      Constitutional/General: Alert and oriented x3, well appearing, non toxic in NAD  Head: Normocephalic and atraumatic  Eyes: PERRL, EOMI  Mouth: Oropharynx clear, handling secretions, no trismus  Neck: Supple, full ROM,   Pulmonary: Lungs clear to auscultation bilaterally, no wheezes, rales, or rhonchi. Not in respiratory distress  Cardiovascular:  Regular rate and rhythm, no murmurs, gallops, or rubs. 2+ distal pulses  Abdomen: Soft, non tender, non distended,   Extremities: Moves all extremities x 4. Warm and well perfused  Skin: warm and dry circumferential area of erythema with blanching to the center. No lymphangitis noted running from the area of erythema noted there is an a line of erythema running from the anterior chest wall to the axilla. And diffuse erythema of the left lateral neck. Neurologic: GCS 15,  Psych: Normal Affect      ------------------------------ ED COURSE/MEDICAL DECISION MAKING----------------------  Medications   doxycycline hyclate (VIBRAMYCIN) capsule 100 mg (100 mg Oral Given 7/5/21 1930)         ED COURSE:       Medical Decision Making:    Patient comes in with complaint of painful rash in the left arm left neck chest wall area that started 2 weeks ago. He felt that he had a insect bite on potentially a tick bite patient was placed on doxycycline twice daily for 14 days and a Lyme titer was obtained. Patient is to follow-up outpatient for Lyme titer results if positive he is to take an additional 7 days of doxycycline. Return to the ER any worsening symptoms    Counseling:    The emergency provider has spoken with the patient and discussed todays results, in addition to providing specific details for the plan of care and

## 2021-07-07 LAB — LYME, EIA: 1.87 LIV (ref 0–1.2)

## 2021-07-10 LAB
BLOOD CULTURE, ROUTINE: NORMAL
CULTURE, BLOOD 2: NORMAL
LYME (B. BURGDORFERI) AB IGG WB: NEGATIVE
LYME AB IGM BY WB:: POSITIVE

## 2021-11-13 ENCOUNTER — HOSPITAL ENCOUNTER (EMERGENCY)
Age: 41
Discharge: HOME | End: 2021-11-13
Attending: EMERGENCY MEDICINE

## 2021-11-13 ENCOUNTER — APPOINTMENT (OUTPATIENT)
Dept: GENERAL RADIOLOGY | Age: 41
End: 2021-11-13

## 2021-11-13 VITALS
OXYGEN SATURATION: 98 % | HEART RATE: 67 BPM | DIASTOLIC BLOOD PRESSURE: 62 MMHG | HEIGHT: 72 IN | BODY MASS INDEX: 23.03 KG/M2 | RESPIRATION RATE: 18 BRPM | SYSTOLIC BLOOD PRESSURE: 118 MMHG | WEIGHT: 170 LBS | TEMPERATURE: 97.9 F

## 2021-11-13 DIAGNOSIS — R07.81 RIB PAIN ON RIGHT SIDE: Primary | ICD-10-CM

## 2021-11-13 PROCEDURE — 96372 THER/PROPH/DIAG INJ SC/IM: CPT

## 2021-11-13 PROCEDURE — 71101 X-RAY EXAM UNILAT RIBS/CHEST: CPT

## 2021-11-13 PROCEDURE — 6360000002 HC RX W HCPCS: Performed by: EMERGENCY MEDICINE

## 2021-11-13 PROCEDURE — 99283 EMERGENCY DEPT VISIT LOW MDM: CPT

## 2021-11-13 RX ORDER — KETOROLAC TROMETHAMINE 30 MG/ML
30 INJECTION, SOLUTION INTRAMUSCULAR; INTRAVENOUS ONCE
Status: COMPLETED | OUTPATIENT
Start: 2021-11-13 | End: 2021-11-13

## 2021-11-13 RX ADMIN — KETOROLAC TROMETHAMINE 30 MG: 30 INJECTION, SOLUTION INTRAMUSCULAR at 13:01

## 2021-11-13 ASSESSMENT — ENCOUNTER SYMPTOMS
DIARRHEA: 0
NAUSEA: 0
CONSTIPATION: 0
SHORTNESS OF BREATH: 0
CHEST TIGHTNESS: 0
CHOKING: 0
COUGH: 0
ABDOMINAL PAIN: 0
SORE THROAT: 0
COLOR CHANGE: 0
VOMITING: 0
BLOOD IN STOOL: 0

## 2021-11-13 ASSESSMENT — PAIN SCALES - GENERAL
PAINLEVEL_OUTOF10: 7
PAINLEVEL_OUTOF10: 7

## 2021-11-13 ASSESSMENT — PAIN DESCRIPTION - LOCATION: LOCATION: RIB CAGE

## 2021-11-13 ASSESSMENT — PAIN DESCRIPTION - ORIENTATION: ORIENTATION: RIGHT

## 2021-11-13 NOTE — ED PROVIDER NOTES
[address]      Pt Name: Hien Cates  MRN: [id number]  [place] 1980  Date of evaluation: 11/13/2021      CHIEF COMPLAINT       Chief Complaint   Patient presents with    Rib Pain     injured right rib cage cleaning gutters yesterday-denies fall        HPI  Hien Cates is a 37 y.o. male past medical history presents after an injury to the right rib cage yesterday. Patient states that he was cleaning the gutters when he misstepped on the lower and hit the edge of the gutter with his right rib cage. Patient denies any falls, loss of consciousness, head trauma but states that he has been having constant rib pain since the injury. Describes the pain as sharp, nonradiating, constant, rated 6 out of 10, worse with movements and deep breaths and no alleviating factors. Patient has tried ibuprofen, tramadol and Flexeril with no improvement of his symptoms. Describes his symptoms as mild in severity. Denies any shortness of breath, cough, chest pain, palpitations, fever, chills, nausea, vomiting, abdominal pain, diarrhea constipation, urinary difficulties. Except as noted above the remainder of the review of systems was reviewed and negative. Review of Systems   Constitutional: Negative for appetite change, chills, fatigue and fever. HENT: Negative for congestion and sore throat. Eyes: Negative for visual disturbance. Respiratory: Negative for cough, choking, chest tightness and shortness of breath. Cardiovascular: Positive for chest pain. Negative for palpitations and leg swelling. Gastrointestinal: Negative for abdominal pain, blood in stool, constipation, diarrhea, nausea and vomiting. Endocrine: Negative for polyphagia. Genitourinary: Negative for decreased urine volume, difficulty urinating, flank pain and hematuria.    Musculoskeletal: Negative for arthralgias, gait problem, joint swelling and myalgias. Skin: Negative for color change, pallor, rash and wound. Neurological: Negative for dizziness, tremors, seizures, syncope, weakness, light-headedness, numbness and headaches. Hematological: Negative for adenopathy. Does not bruise/bleed easily. Psychiatric/Behavioral: Negative for confusion and hallucinations. All other systems reviewed and are negative. Physical Exam  Vitals reviewed. Constitutional:       General: He is not in acute distress. Appearance: Normal appearance. He is normal weight. He is not ill-appearing, toxic-appearing or diaphoretic. HENT:      Head: Normocephalic and atraumatic. Right Ear: External ear normal.      Left Ear: External ear normal.      Nose: Nose normal. No congestion or rhinorrhea. Mouth/Throat:      Mouth: Mucous membranes are moist.      Pharynx: Oropharynx is clear. No oropharyngeal exudate or posterior oropharyngeal erythema. Eyes:      Extraocular Movements: Extraocular movements intact. Conjunctiva/sclera: Conjunctivae normal.      Pupils: Pupils are equal, round, and reactive to light. Cardiovascular:      Rate and Rhythm: Normal rate and regular rhythm. Pulses: Normal pulses. Pulmonary:      Effort: Pulmonary effort is normal. No respiratory distress. Breath sounds: Normal breath sounds. No wheezing or rhonchi. Comments: Tenderness over ribs and in between ribs at lower anterior ribs on the right side. Chest:      Chest wall: Tenderness present. Abdominal:      General: Abdomen is flat. Bowel sounds are normal. There is no distension. Palpations: Abdomen is soft. Tenderness: There is abdominal tenderness. There is no right CVA tenderness, left CVA tenderness or guarding. Hernia: No hernia is present. Musculoskeletal:      Cervical back: Normal range of motion. Right lower leg: No edema. Left lower leg: No edema. Skin:     General: Skin is warm and dry.       Capillary Refill: Capillary refill takes less than 2 seconds. Neurological:      General: No focal deficit present. Mental Status: He is alert and oriented to person, place, and time. Mental status is at baseline. Psychiatric:         Mood and Affect: Mood normal.         Behavior: Behavior normal.         Thought Content: Thought content normal.         Judgment: Judgment normal.          Procedures     MDM   37 y.o. male past medical history presents after an injury to the right rib cage yesterday. Patient states that he was cleaning the gutters when he misstepped on the lower and hit the edge of the gutter with his right rib cage. While in the ED patient was hemodynamically stable, nontoxic-appearing, in no respiratory distress. Physical exam remarkable for tenderness in between his lower right-sided ribs, worse in between his ribs as opposed to over ribs. Normal breath sounds. CXR with rib imaging negative for any acute fractures or pathologies. Patient received toradol with improvement of symptoms. Patient feels comfortable going home, he has NSAID and muscle relaxants at home and will follow up outpatient with PCP. Patient understands to return to the ED in case of worsening symptoms. --------------------------------------------- PAST HISTORY ---------------------------------------------  Past Medical History:  has a past medical history of Arm pain, Hepatitis, Neck pain, Osteoarthritis of spine with radiculopathy, cervical region, and Shoulder pain. Past Surgical History:  has no past surgical history on file. Social History:  reports that he has been smoking cigarettes. He has been smoking about 1.00 pack per day. He has quit using smokeless tobacco. He reports current alcohol use. He reports current drug use. Drug: Marijuana Komal Kos). Family History: family history includes Diabetes in his mother; Heart Disease in his father; High Blood Pressure in his mother.      The patients home medications have been reviewed. Allergies: Patient has no known allergies. -------------------------------------------------- RESULTS -------------------------------------------------  Labs:  No results found for this visit on 11/13/21. Radiology:  XR RIBS RIGHT INCLUDE CHEST (MIN 3 VIEWS)   Final Result   No acute cardiopulmonary process or evidence of a rib fracture.             ------------------------- NURSING NOTES AND VITALS REVIEWED ---------------------------  Date / Time Roomed:  11/13/2021  9:56 AM  ED Bed Assignment:  26/26    The nursing notes within the ED encounter and vital signs as below have been reviewed. /62   Pulse 67   Temp 97.9 °F (36.6 °C)   Resp 18   Ht 6' (1.829 m)   Wt 170 lb (77.1 kg)   SpO2 98%   BMI 23.06 kg/m²   Oxygen Saturation Interpretation: Normal      ------------------------------------------ PROGRESS NOTES ------------------------------------------  12:57 PM EST  I have spoken with the patient and discussed todays results, in addition to providing specific details for the plan of care and counseling regarding the diagnosis and prognosis. Their questions are answered at this time and they are agreeable with the plan. I discussed at length with them reasons for immediate return here for re evaluation. They will followup with their primary care physician by calling their office tomorrow. --------------------------------- ADDITIONAL PROVIDER NOTES ---------------------------------  At this time the patient is without objective evidence of an acute process requiring hospitalization or inpatient management. They have remained hemodynamically stable throughout their entire ED visit and are stable for discharge with outpatient follow-up. The plan has been discussed in detail and they are aware of the specific conditions for emergent return, as well as the importance of follow-up. New Prescriptions    No medications on file       Diagnosis:  1. Rib pain on right side        Disposition:  Patient's disposition: Discharge to home  Patient's condition is stable.          Gallito Jessica MD  Resident  11/13/21 [id number]

## 2021-12-30 ENCOUNTER — HOSPITAL ENCOUNTER (EMERGENCY)
Age: 41
Discharge: HOME | End: 2021-12-30
Attending: EMERGENCY MEDICINE

## 2021-12-30 ENCOUNTER — APPOINTMENT (OUTPATIENT)
Dept: GENERAL RADIOLOGY | Age: 41
End: 2021-12-30

## 2021-12-30 VITALS
TEMPERATURE: 97.6 F | DIASTOLIC BLOOD PRESSURE: 79 MMHG | BODY MASS INDEX: 23.16 KG/M2 | SYSTOLIC BLOOD PRESSURE: 110 MMHG | HEART RATE: 68 BPM | WEIGHT: 171 LBS | HEIGHT: 72 IN | RESPIRATION RATE: 16 BRPM | OXYGEN SATURATION: 97 %

## 2021-12-30 DIAGNOSIS — R11.0 NAUSEA: ICD-10-CM

## 2021-12-30 DIAGNOSIS — U07.1 COVID-19: Primary | ICD-10-CM

## 2021-12-30 LAB
ALBUMIN SERPL-MCNC: 4.5 G/DL (ref 3.5–5.2)
ALP BLD-CCNC: 74 U/L (ref 40–129)
ALT SERPL-CCNC: 26 U/L (ref 0–40)
ANION GAP SERPL CALCULATED.3IONS-SCNC: 9 MMOL/L (ref 7–16)
AST SERPL-CCNC: 22 U/L (ref 0–39)
BASOPHILS ABSOLUTE: 0.04 E9/L (ref 0–0.2)
BASOPHILS RELATIVE PERCENT: 0.6 % (ref 0–2)
BILIRUB SERPL-MCNC: 0.5 MG/DL (ref 0–1.2)
BUN BLDV-MCNC: 17 MG/DL (ref 6–20)
CALCIUM SERPL-MCNC: 8.8 MG/DL (ref 8.6–10.2)
CHLORIDE BLD-SCNC: 104 MMOL/L (ref 98–107)
CO2: 26 MMOL/L (ref 22–29)
CREAT SERPL-MCNC: 0.7 MG/DL (ref 0.7–1.2)
EKG ATRIAL RATE: 66 BPM
EKG P AXIS: 65 DEGREES
EKG P-R INTERVAL: 162 MS
EKG Q-T INTERVAL: 410 MS
EKG QRS DURATION: 114 MS
EKG QTC CALCULATION (BAZETT): 429 MS
EKG R AXIS: 86 DEGREES
EKG T AXIS: 64 DEGREES
EKG VENTRICULAR RATE: 66 BPM
EOSINOPHILS ABSOLUTE: 0.1 E9/L (ref 0.05–0.5)
EOSINOPHILS RELATIVE PERCENT: 1.6 % (ref 0–6)
GFR AFRICAN AMERICAN: >60
GFR NON-AFRICAN AMERICAN: >60 ML/MIN/1.73
GLUCOSE BLD-MCNC: 111 MG/DL (ref 74–99)
HCT VFR BLD CALC: 45.4 % (ref 37–54)
HEMOGLOBIN: 15.6 G/DL (ref 12.5–16.5)
IMMATURE GRANULOCYTES #: 0.02 E9/L
IMMATURE GRANULOCYTES %: 0.3 % (ref 0–5)
LIPASE: 19 U/L (ref 13–60)
LYMPHOCYTES ABSOLUTE: 1.47 E9/L (ref 1.5–4)
LYMPHOCYTES RELATIVE PERCENT: 23.4 % (ref 20–42)
MCH RBC QN AUTO: 29.7 PG (ref 26–35)
MCHC RBC AUTO-ENTMCNC: 34.4 % (ref 32–34.5)
MCV RBC AUTO: 86.5 FL (ref 80–99.9)
MONOCYTES ABSOLUTE: 0.46 E9/L (ref 0.1–0.95)
MONOCYTES RELATIVE PERCENT: 7.3 % (ref 2–12)
NEUTROPHILS ABSOLUTE: 4.18 E9/L (ref 1.8–7.3)
NEUTROPHILS RELATIVE PERCENT: 66.8 % (ref 43–80)
PDW BLD-RTO: 11.9 FL (ref 11.5–15)
PLATELET # BLD: 252 E9/L (ref 130–450)
PMV BLD AUTO: 10 FL (ref 7–12)
POTASSIUM REFLEX MAGNESIUM: 3.8 MMOL/L (ref 3.5–5)
RBC # BLD: 5.25 E12/L (ref 3.8–5.8)
SARS-COV-2, NAAT: DETECTED
SODIUM BLD-SCNC: 139 MMOL/L (ref 132–146)
TOTAL PROTEIN: 6.9 G/DL (ref 6.4–8.3)
TROPONIN, HIGH SENSITIVITY: 6 NG/L (ref 0–11)
WBC # BLD: 6.3 E9/L (ref 4.5–11.5)

## 2021-12-30 PROCEDURE — 2580000003 HC RX 258: Performed by: STUDENT IN AN ORGANIZED HEALTH CARE EDUCATION/TRAINING PROGRAM

## 2021-12-30 PROCEDURE — 85025 COMPLETE CBC W/AUTO DIFF WBC: CPT

## 2021-12-30 PROCEDURE — 80053 COMPREHEN METABOLIC PANEL: CPT

## 2021-12-30 PROCEDURE — 71045 X-RAY EXAM CHEST 1 VIEW: CPT

## 2021-12-30 PROCEDURE — 83690 ASSAY OF LIPASE: CPT

## 2021-12-30 PROCEDURE — 87635 SARS-COV-2 COVID-19 AMP PRB: CPT

## 2021-12-30 PROCEDURE — 93005 ELECTROCARDIOGRAM TRACING: CPT | Performed by: STUDENT IN AN ORGANIZED HEALTH CARE EDUCATION/TRAINING PROGRAM

## 2021-12-30 PROCEDURE — 96374 THER/PROPH/DIAG INJ IV PUSH: CPT

## 2021-12-30 PROCEDURE — 6370000000 HC RX 637 (ALT 250 FOR IP): Performed by: STUDENT IN AN ORGANIZED HEALTH CARE EDUCATION/TRAINING PROGRAM

## 2021-12-30 PROCEDURE — 6360000002 HC RX W HCPCS: Performed by: STUDENT IN AN ORGANIZED HEALTH CARE EDUCATION/TRAINING PROGRAM

## 2021-12-30 PROCEDURE — 99284 EMERGENCY DEPT VISIT MOD MDM: CPT

## 2021-12-30 PROCEDURE — 84484 ASSAY OF TROPONIN QUANT: CPT

## 2021-12-30 RX ORDER — 0.9 % SODIUM CHLORIDE 0.9 %
1000 INTRAVENOUS SOLUTION INTRAVENOUS ONCE
Status: COMPLETED | OUTPATIENT
Start: 2021-12-30 | End: 2021-12-30

## 2021-12-30 RX ORDER — ONDANSETRON 4 MG/1
4 TABLET, ORALLY DISINTEGRATING ORAL 3 TIMES DAILY PRN
Qty: 21 TABLET | Refills: 0 | Status: SHIPPED | OUTPATIENT
Start: 2021-12-30 | End: 2022-04-17 | Stop reason: ALTCHOICE

## 2021-12-30 RX ORDER — ONDANSETRON 2 MG/ML
4 INJECTION INTRAMUSCULAR; INTRAVENOUS ONCE
Status: COMPLETED | OUTPATIENT
Start: 2021-12-30 | End: 2021-12-30

## 2021-12-30 RX ORDER — ACETAMINOPHEN 500 MG
1000 TABLET ORAL ONCE
Status: COMPLETED | OUTPATIENT
Start: 2021-12-30 | End: 2021-12-30

## 2021-12-30 RX ADMIN — ONDANSETRON 4 MG: 2 INJECTION INTRAMUSCULAR; INTRAVENOUS at 07:55

## 2021-12-30 RX ADMIN — SODIUM CHLORIDE 1000 ML: 9 INJECTION, SOLUTION INTRAVENOUS at 06:51

## 2021-12-30 RX ADMIN — ACETAMINOPHEN 1000 MG: 500 TABLET ORAL at 07:55

## 2021-12-30 ASSESSMENT — ENCOUNTER SYMPTOMS
SORE THROAT: 0
ABDOMINAL PAIN: 0
VOMITING: 0
DIARRHEA: 0
NAUSEA: 1
BACK PAIN: 0
COUGH: 1
RHINORRHEA: 0
CHEST TIGHTNESS: 1
SHORTNESS OF BREATH: 0

## 2021-12-30 ASSESSMENT — PAIN SCALES - GENERAL: PAINLEVEL_OUTOF10: 6

## 2021-12-30 NOTE — ED TRIAGE NOTES
Pt states that wife in Covid Positive. PT woke with fever and chills, C/O nausea.  Took Motrin at 0400

## 2021-12-30 NOTE — ED PROVIDER NOTES
[address]  Department of Emergency Medicine     Written by: Kim Colindres DO  Patient Name: Mackenzie Anderson  Attending Provider: No att. providers found  Admit Date: 2021  5:53 AM  MRN: [id number]                   : 1980        Chief Complaint   Patient presents with   Chloe Fickle Fever    Cough    Nausea    - Chief complaint    HPI   Mackenzie Anderson is a 37 y.o. male presenting to the ED for evaluation of Fever, Cough, and Nausea    Patient afebrile here in the department. His fevers have been subjective, states he has been having sweats and cold chills. His wife at home is Covid positive x13 days. He started having symptoms about 4 days ago. He has been having dry cough, nausea with vomiting, and fatigue. Also endorses decreased p.o. intake. Patient denies any chest pain or palpitations but does state he gets some chest tightness when coughing. Denies any abdominal pain, diarrhea, black or bloody stools, or abnormal urination. He is not vaccinated against COVID-19. His complaints are moderate in severity, no specific aggravating or alleviating factors, have been gradual in onset and worsening. Review of Systems   Constitutional: Positive for appetite change (decreased), chills and fatigue. Negative for fever (subjective). HENT: Negative for rhinorrhea and sore throat. Eyes: Negative for visual disturbance. Respiratory: Positive for cough and chest tightness. Negative for shortness of breath. Cardiovascular: Negative for chest pain and palpitations. Gastrointestinal: Positive for nausea. Negative for abdominal pain, diarrhea and vomiting. Endocrine: Negative for polydipsia and polyuria. Genitourinary: Negative for dysuria and frequency. Musculoskeletal: Negative for back pain and myalgias. Skin: Negative for rash and wound. Neurological: Negative for weakness and headaches. Psychiatric/Behavioral: Negative for confusion.    All other systems reviewed and are negative. Physical Exam  Vitals and nursing note reviewed. Constitutional:       General: He is not in acute distress. Appearance: He is not toxic-appearing. HENT:      Head: Normocephalic and atraumatic. Right Ear: External ear normal.      Left Ear: External ear normal.      Nose: Nose normal. No rhinorrhea. Mouth/Throat:      Mouth: Mucous membranes are moist.      Pharynx: Oropharynx is clear. Eyes:      Extraocular Movements: Extraocular movements intact. Conjunctiva/sclera: Conjunctivae normal.      Pupils: Pupils are equal, round, and reactive to light. Cardiovascular:      Rate and Rhythm: Normal rate and regular rhythm. Pulses: Normal pulses. Heart sounds: Normal heart sounds. Pulmonary:      Effort: Pulmonary effort is normal. No respiratory distress. Breath sounds: Normal breath sounds. No wheezing or rales. Abdominal:      General: Bowel sounds are normal.      Palpations: Abdomen is soft. Tenderness: There is no abdominal tenderness. There is no guarding or rebound. Musculoskeletal:         General: No tenderness. Normal range of motion. Cervical back: Normal range of motion and neck supple. No rigidity or tenderness. Right lower leg: No edema. Left lower leg: No edema. Skin:     General: Skin is warm and dry. Capillary Refill: Capillary refill takes less than 2 seconds. Coloration: Skin is not jaundiced or pale. Findings: No rash. Neurological:      General: No focal deficit present. Mental Status: He is alert and oriented to person, place, and time.    Psychiatric:         Mood and Affect: Mood normal.         Behavior: Behavior normal.          Procedures       [facility]     ED Course as of 12/31/21 0027   Thu Dec 30, 2021   0751 SARS-CoV-2, NAAT(!): DETECTED [VG]      ED Course User Index  [VG] Pretty Rosario DO       [facility]    This is a 37 y.o. male presenting for evaluation of cough, fatigue, subjective fever, concern for covid 19. On arrival patient is alert, oriented, he is not toxic in appearance, does appear fatigued and mildly uncomfortable due to symptoms. He answers questions and responds to examination appropriately. He is not in any acute distress. Lungs CTA bilaterally. Abdomen soft nontender. Patient endorsing nausea and body aches during this encounter, was given IV fluids, Tylenol, and Zofran with significant improvement of symptoms. Rapid Covid is positive. Labs reviewed and otherwise generally unremarkable. Patient's vitals have been stable, he is afebrile, he is not hypoxic and he is not tachycardic. CXR shows no acute abnormality. Given these findings, feel patient is stable and appropriate for discharge. Discussed results and plan with the patient, he voiced understanding and is amenable. Return precautions were discussed. He was provided prescription for Zofran. EKG reviewed and interpreted by me:    Rate is 66; rhythm is sinus; interpretation is sinus rhythm, sinus arrhythmia, normal axis, , , QTc 429. I have discussed this patient with my attending, who has seen the patient and agrees with this disposition. Patient was seen and evaluated by myself and my attending Barbara Mcmillan DO. Assessment and Plan discussed with attending provider, please see attestation for final plan of care.       --------------------------------------------- PAST HISTORY ---------------------------------------------  Past Medical History:  has a past medical history of Arm pain, Hepatitis, Neck pain, Osteoarthritis of spine with radiculopathy, cervical region, and Shoulder pain. Past Surgical History:  has no past surgical history on file. Social History:  reports that he has been smoking cigarettes. He has been smoking about 1.00 pack per day. He has quit using smokeless tobacco. He reports current alcohol use. He reports current drug use.  Drug: Marijuana (Rosezella Dubin). Family History: family history includes Diabetes in his mother; Heart Disease in his father; High Blood Pressure in his mother. The patients home medications have been reviewed. Allergies: Patient has no known allergies.     -------------------------------------------------- RESULTS -------------------------------------------------  Labs:  Results for orders placed or performed during the hospital encounter of 12/30/21   COVID-19, Rapid    Specimen: Nasopharyngeal Swab   Result Value Ref Range    SARS-CoV-2, NAAT DETECTED (A) Not Detected   CBC Auto Differential   Result Value Ref Range    WBC 6.3 4.5 - 11.5 E9/L    RBC 5.25 3.80 - 5.80 E12/L    Hemoglobin 15.6 12.5 - 16.5 g/dL    Hematocrit 45.4 37.0 - 54.0 %    MCV 86.5 80.0 - 99.9 fL    MCH 29.7 26.0 - 35.0 pg    MCHC 34.4 32.0 - 34.5 %    RDW 11.9 11.5 - 15.0 fL    Platelets 748 732 - 919 E9/L    MPV 10.0 7.0 - 12.0 fL    Neutrophils % 66.8 43.0 - 80.0 %    Immature Granulocytes % 0.3 0.0 - 5.0 %    Lymphocytes % 23.4 20.0 - 42.0 %    Monocytes % 7.3 2.0 - 12.0 %    Eosinophils % 1.6 0.0 - 6.0 %    Basophils % 0.6 0.0 - 2.0 %    Neutrophils Absolute 4.18 1.80 - 7.30 E9/L    Immature Granulocytes # 0.02 E9/L    Lymphocytes Absolute 1.47 (L) 1.50 - 4.00 E9/L    Monocytes Absolute 0.46 0.10 - 0.95 E9/L    Eosinophils Absolute 0.10 0.05 - 0.50 E9/L    Basophils Absolute 0.04 0.00 - 0.20 E9/L   Comprehensive Metabolic Panel w/ Reflex to MG   Result Value Ref Range    Sodium 139 132 - 146 mmol/L    Potassium reflex Magnesium 3.8 3.5 - 5.0 mmol/L    Chloride 104 98 - 107 mmol/L    CO2 26 22 - 29 mmol/L    Anion Gap 9 7 - 16 mmol/L    Glucose 111 (H) 74 - 99 mg/dL    BUN 17 6 - 20 mg/dL    CREATININE 0.7 0.7 - 1.2 mg/dL    GFR Non-African American >60 >=60 mL/min/1.73    GFR African American >60     Calcium 8.8 8.6 - 10.2 mg/dL    Total Protein 6.9 6.4 - 8.3 g/dL    Albumin 4.5 3.5 - 5.2 g/dL    Total Bilirubin 0.5 0.0 - 1.2 mg/dL    Alkaline Phosphatase 74 40 - 129 U/L    ALT 26 0 - 40 U/L    AST 22 0 - 39 U/L   Lipase   Result Value Ref Range    Lipase 19 13 - 60 U/L   Troponin   Result Value Ref Range    Troponin, High Sensitivity 6 0 - 11 ng/L   EKG 12 Lead   Result Value Ref Range    Ventricular Rate 66 BPM    Atrial Rate 66 BPM    P-R Interval 162 ms    QRS Duration 114 ms    Q-T Interval 410 ms    QTc Calculation (Bazett) 429 ms    P Axis 65 degrees    R Axis 86 degrees    T Axis 64 degrees       Radiology:  XR CHEST PORTABLE   Final Result   No pneumonia or pleural effusion.               ------------------------- NURSING NOTES AND VITALS REVIEWED ---------------------------  Date / Time Roomed:  12/30/2021  5:53 AM  ED Bed Assignment:  16/16    The nursing notes within the ED encounter and vital signs as below have been reviewed. /79   Pulse 68   Temp 97.6 °F (36.4 °C) (Oral)   Resp 16   Ht 6' (1.829 m)   Wt 171 lb (77.6 kg)   SpO2 97%   BMI 23.19 kg/m²   Oxygen Saturation Interpretation: Normal      ------------------------------------------ PROGRESS NOTES ------------------------------------------  12:24 AM EST  I have spoken with the patient and discussed todays results, in addition to providing specific details for the plan of care and counseling regarding the diagnosis and prognosis. Their questions are answered at this time and they are agreeable with the plan. I discussed at length with them reasons for immediate return here for re evaluation. They will followup with their primary care physician by calling their office tomorrow. --------------------------------- ADDITIONAL PROVIDER NOTES ---------------------------------  At this time the patient is without objective evidence of an acute process requiring hospitalization or inpatient management. They have remained hemodynamically stable throughout their entire ED visit and are stable for discharge with outpatient follow-up.      The plan has been discussed in detail and they are aware of the specific conditions for emergent return, as well as the importance of follow-up. Discharge Medication List as of 12/30/2021  8:59 AM      START taking these medications    Details   ondansetron (ZOFRAN-ODT) 4 MG disintegrating tablet Take 1 tablet by mouth 3 times daily as needed for Nausea or Vomiting, Disp-21 tablet, R-0Print             Diagnosis:  1. COVID-19    2. Nausea        Disposition:  Patient's disposition: Discharge to home  Patient's condition is stable.        Nuzhat Hardy, DO  Resident  12/31/21 [id number]

## 2021-12-30 NOTE — ED NOTES
[address] [place] in [address] [facility]  12/30/21 1012

## 2022-04-17 ENCOUNTER — HOSPITAL ENCOUNTER (EMERGENCY)
Age: 42
Discharge: HOME | End: 2022-04-17
Payer: MEDICAID

## 2022-04-17 ENCOUNTER — APPOINTMENT (OUTPATIENT)
Dept: CT IMAGING | Age: 42
End: 2022-04-17
Payer: MEDICAID

## 2022-04-17 VITALS
DIASTOLIC BLOOD PRESSURE: 81 MMHG | OXYGEN SATURATION: 98 % | RESPIRATION RATE: 24 BRPM | HEART RATE: 94 BPM | TEMPERATURE: 97.2 F | SYSTOLIC BLOOD PRESSURE: 122 MMHG

## 2022-04-17 DIAGNOSIS — Z86.19 HISTORY OF HELICOBACTER PYLORI INFECTION: ICD-10-CM

## 2022-04-17 DIAGNOSIS — R10.13 ABDOMINAL PAIN, EPIGASTRIC: Primary | ICD-10-CM

## 2022-04-17 LAB
ALBUMIN SERPL-MCNC: 4.4 G/DL (ref 3.5–5.2)
ALP BLD-CCNC: 87 U/L (ref 40–129)
ALT SERPL-CCNC: 27 U/L (ref 0–40)
ANION GAP SERPL CALCULATED.3IONS-SCNC: 12 MMOL/L (ref 7–16)
AST SERPL-CCNC: 24 U/L (ref 0–39)
BACTERIA: ABNORMAL /HPF
BASOPHILS ABSOLUTE: 0.09 E9/L (ref 0–0.2)
BASOPHILS RELATIVE PERCENT: 0.8 % (ref 0–2)
BILIRUB SERPL-MCNC: 0.4 MG/DL (ref 0–1.2)
BILIRUBIN URINE: NEGATIVE
BLOOD, URINE: NEGATIVE
BUN BLDV-MCNC: 17 MG/DL (ref 6–20)
CALCIUM SERPL-MCNC: 9.3 MG/DL (ref 8.6–10.2)
CHLORIDE BLD-SCNC: 101 MMOL/L (ref 98–107)
CLARITY: CLEAR
CO2: 27 MMOL/L (ref 22–29)
COLOR: YELLOW
CREAT SERPL-MCNC: 1 MG/DL (ref 0.7–1.2)
EOSINOPHILS ABSOLUTE: 0.08 E9/L (ref 0.05–0.5)
EOSINOPHILS RELATIVE PERCENT: 0.7 % (ref 0–6)
GFR AFRICAN AMERICAN: >60
GFR NON-AFRICAN AMERICAN: >60 ML/MIN/1.73
GLUCOSE BLD-MCNC: 126 MG/DL (ref 74–99)
GLUCOSE URINE: NEGATIVE MG/DL
HCT VFR BLD CALC: 45.4 % (ref 37–54)
HEMOGLOBIN: 15.4 G/DL (ref 12.5–16.5)
IMMATURE GRANULOCYTES #: 0.04 E9/L
IMMATURE GRANULOCYTES %: 0.3 % (ref 0–5)
KETONES, URINE: ABNORMAL MG/DL
LACTIC ACID: 0.8 MMOL/L (ref 0.5–2.2)
LEUKOCYTE ESTERASE, URINE: NEGATIVE
LIPASE: 20 U/L (ref 13–60)
LYMPHOCYTES ABSOLUTE: 1.28 E9/L (ref 1.5–4)
LYMPHOCYTES RELATIVE PERCENT: 10.8 % (ref 20–42)
MCH RBC QN AUTO: 29.8 PG (ref 26–35)
MCHC RBC AUTO-ENTMCNC: 33.9 % (ref 32–34.5)
MCV RBC AUTO: 87.8 FL (ref 80–99.9)
MONOCYTES ABSOLUTE: 0.7 E9/L (ref 0.1–0.95)
MONOCYTES RELATIVE PERCENT: 5.9 % (ref 2–12)
NEUTROPHILS ABSOLUTE: 9.68 E9/L (ref 1.8–7.3)
NEUTROPHILS RELATIVE PERCENT: 81.5 % (ref 43–80)
NITRITE, URINE: NEGATIVE
PDW BLD-RTO: 12 FL (ref 11.5–15)
PH UA: 6 (ref 5–9)
PLATELET # BLD: 312 E9/L (ref 130–450)
PMV BLD AUTO: 9.7 FL (ref 7–12)
POTASSIUM SERPL-SCNC: 4.7 MMOL/L (ref 3.5–5)
PROTEIN UA: NEGATIVE MG/DL
RBC # BLD: 5.17 E12/L (ref 3.8–5.8)
RBC UA: ABNORMAL /HPF (ref 0–2)
SODIUM BLD-SCNC: 140 MMOL/L (ref 132–146)
SPECIFIC GRAVITY UA: 1.02 (ref 1–1.03)
TOTAL PROTEIN: 7.5 G/DL (ref 6.4–8.3)
UROBILINOGEN, URINE: 0.2 E.U./DL
WBC # BLD: 11.9 E9/L (ref 4.5–11.5)
WBC UA: ABNORMAL /HPF (ref 0–5)

## 2022-04-17 PROCEDURE — C9113 INJ PANTOPRAZOLE SODIUM, VIA: HCPCS | Performed by: PHYSICIAN ASSISTANT

## 2022-04-17 PROCEDURE — 6360000002 HC RX W HCPCS: Performed by: PHYSICIAN ASSISTANT

## 2022-04-17 PROCEDURE — 6360000004 HC RX CONTRAST MEDICATION: Performed by: RADIOLOGY

## 2022-04-17 PROCEDURE — 96375 TX/PRO/DX INJ NEW DRUG ADDON: CPT

## 2022-04-17 PROCEDURE — 83690 ASSAY OF LIPASE: CPT

## 2022-04-17 PROCEDURE — 83605 ASSAY OF LACTIC ACID: CPT

## 2022-04-17 PROCEDURE — 96374 THER/PROPH/DIAG INJ IV PUSH: CPT

## 2022-04-17 PROCEDURE — 2580000003 HC RX 258: Performed by: PHYSICIAN ASSISTANT

## 2022-04-17 PROCEDURE — 6370000000 HC RX 637 (ALT 250 FOR IP): Performed by: PHYSICIAN ASSISTANT

## 2022-04-17 PROCEDURE — 80053 COMPREHEN METABOLIC PANEL: CPT

## 2022-04-17 PROCEDURE — 85025 COMPLETE CBC W/AUTO DIFF WBC: CPT

## 2022-04-17 PROCEDURE — 99283 EMERGENCY DEPT VISIT LOW MDM: CPT

## 2022-04-17 PROCEDURE — 81001 URINALYSIS AUTO W/SCOPE: CPT

## 2022-04-17 PROCEDURE — 74177 CT ABD & PELVIS W/CONTRAST: CPT

## 2022-04-17 RX ORDER — 0.9 % SODIUM CHLORIDE 0.9 %
1000 INTRAVENOUS SOLUTION INTRAVENOUS ONCE
Status: COMPLETED | OUTPATIENT
Start: 2022-04-17 | End: 2022-04-17

## 2022-04-17 RX ORDER — ONDANSETRON 2 MG/ML
4 INJECTION INTRAMUSCULAR; INTRAVENOUS ONCE
Status: COMPLETED | OUTPATIENT
Start: 2022-04-17 | End: 2022-04-17

## 2022-04-17 RX ORDER — PANTOPRAZOLE SODIUM 40 MG/1
40 TABLET, DELAYED RELEASE ORAL DAILY
Qty: 14 TABLET | Refills: 0 | Status: SHIPPED | OUTPATIENT
Start: 2022-04-17 | End: 2022-05-01

## 2022-04-17 RX ORDER — ONDANSETRON 4 MG/1
4 TABLET, ORALLY DISINTEGRATING ORAL EVERY 8 HOURS PRN
Qty: 24 TABLET | Refills: 0 | Status: SHIPPED | OUTPATIENT
Start: 2022-04-17 | End: 2022-11-02

## 2022-04-17 RX ORDER — PANTOPRAZOLE SODIUM 40 MG/10ML
40 INJECTION, POWDER, LYOPHILIZED, FOR SOLUTION INTRAVENOUS ONCE
Status: COMPLETED | OUTPATIENT
Start: 2022-04-17 | End: 2022-04-17

## 2022-04-17 RX ORDER — METRONIDAZOLE 500 MG/1
500 TABLET ORAL 4 TIMES DAILY
Qty: 56 TABLET | Refills: 0 | Status: SHIPPED | OUTPATIENT
Start: 2022-04-17 | End: 2022-05-01

## 2022-04-17 RX ORDER — TETRACYCLINE HYDROCHLORIDE 500 MG/1
500 CAPSULE ORAL 4 TIMES DAILY
Qty: 56 CAPSULE | Refills: 0 | Status: SHIPPED | OUTPATIENT
Start: 2022-04-17 | End: 2022-05-01

## 2022-04-17 RX ADMIN — ONDANSETRON 4 MG: 2 INJECTION INTRAMUSCULAR; INTRAVENOUS at 12:49

## 2022-04-17 RX ADMIN — LIDOCAINE HYDROCHLORIDE: 20 SOLUTION ORAL; TOPICAL at 12:48

## 2022-04-17 RX ADMIN — IOPAMIDOL 75 ML: 755 INJECTION, SOLUTION INTRAVENOUS at 13:45

## 2022-04-17 RX ADMIN — SODIUM CHLORIDE 1000 ML: 9 INJECTION, SOLUTION INTRAVENOUS at 12:47

## 2022-04-17 RX ADMIN — PANTOPRAZOLE SODIUM 40 MG: 40 INJECTION, POWDER, FOR SOLUTION INTRAVENOUS at 12:48

## 2022-04-17 NOTE — ED PROVIDER NOTES
[facility]       Department of Emergency Medicine   ED  Encounter Note  Admit Date/RoomTime: 2022 12:04 PM  ED Room:     NAME: John Rojo  : 1980  MRN: [id number]     Chief Complaint:  Abdominal Pain    History of Present Illness        John Rojo is a 37 y.o. old male who presents to the emergency department by private vehicle, for gradual onset cramping pain in the upper abdomen with radiation to the back which began day(s) prior to arrival.  There has been similar episodes in the past due to [facility]. Pylori. \"  Since onset the symptoms have been remaining constant. The pain is associated with nausea, chills, and diarrhea. The pain is aggravated by p.o. intake and relieved by nothing. There has been NO chest pain, shortness of breath, fever, sweating, vomiting, constipation, dark/black stools, blood in stool, blood in emesis, cloudy urine, urinary frequency, dysuria, hematuria, urinary urgency, urinary incontinence, penile discharge or scrotal pain. ROS   Pertinent positives and negatives are stated within HPI, all other systems reviewed and are negative. Past Medical History:  has a past medical history of Arm pain, Hepatitis, Neck pain, Osteoarthritis of spine with radiculopathy, cervical region, and Shoulder pain. Surgical History:  has no past surgical history on file. Social History:  reports that he has been smoking cigarettes. He has been smoking about 1.00 pack per day. He has quit using smokeless tobacco. He reports current alcohol use. He reports current drug use. Frequency: 7.00 times per week. Drug: Marijuana Lum [place]). Family History: family history includes Diabetes in his mother; Heart Disease in his father; High Blood Pressure in his mother. Allergies: Patient has no known allergies.     Physical Exam   Oxygen Saturation Interpretation: Normal.        ED Triage Vitals [22 1154]   BP Temp Temp Source Pulse Resp SpO2 Height Weight   122/81 97.2 °F (36.2 °C) Infrared 94 24 98 % -- --       Physical Exam  General Appearance/Constitutional:  Alert, development consistent with age. HEENT:  NC/NT. PERRLA. Airway patent. Neck:  Supple. No lymphadenopathy. Respiratory: Lungs Clear to auscultation and breath sounds equal.  CV:  Regular rate and rhythm. GI:  normal appearing, non-distended with no visible hernias. Bowel sounds: normal bowel sounds. Tenderness: There is moderate tenderness present - located in the epigastrium. , There is no rebound tenderness. , There is no guarding. , There is no distension. , There is no pulsatile mass. .    Back: CVA Tenderness: No CVA tenderness. Integument:  Normal turgor. Warm, dry, without visible rash, unless noted elsewhere. Neurological:  Orientation age-appropriate. Motor functions intact.     Lab / Imaging Results   (All laboratory and radiology results have been personally reviewed by myself)  Labs:  Results for orders placed or performed during the hospital encounter of 04/17/22   Comprehensive Metabolic Panel   Result Value Ref Range    Sodium 140 132 - 146 mmol/L    Potassium 4.7 3.5 - 5.0 mmol/L    Chloride 101 98 - 107 mmol/L    CO2 27 22 - 29 mmol/L    Anion Gap 12 7 - 16 mmol/L    Glucose 126 (H) 74 - 99 mg/dL    BUN 17 6 - 20 mg/dL    CREATININE 1.0 0.7 - 1.2 mg/dL    GFR Non-African American >60 >=60 mL/min/1.73    GFR African American >60     Calcium 9.3 8.6 - 10.2 mg/dL    Total Protein 7.5 6.4 - 8.3 g/dL    Albumin 4.4 3.5 - 5.2 g/dL    Total Bilirubin 0.4 0.0 - 1.2 mg/dL    Alkaline Phosphatase 87 40 - 129 U/L    ALT 27 0 - 40 U/L    AST 24 0 - 39 U/L   CBC with Auto Differential   Result Value Ref Range    WBC 11.9 (H) 4.5 - 11.5 E9/L    RBC 5.17 3.80 - 5.80 E12/L    Hemoglobin 15.4 12.5 - 16.5 g/dL    Hematocrit 45.4 37.0 - 54.0 %    MCV 87.8 80.0 - 99.9 fL    MCH 29.8 26.0 - 35.0 pg    MCHC 33.9 32.0 - 34.5 %    RDW 12.0 11.5 - 15.0 fL    Platelets 184 359 - 217 E9/L    MPV 9.7 7.0 - 12.0 fL    Neutrophils % 81.5 (H) 43.0 - 80.0 %    Immature Granulocytes % 0.3 0.0 - 5.0 %    Lymphocytes % 10.8 (L) 20.0 - 42.0 %    Monocytes % 5.9 2.0 - 12.0 %    Eosinophils % 0.7 0.0 - 6.0 %    Basophils % 0.8 0.0 - 2.0 %    Neutrophils Absolute 9.68 (H) 1.80 - 7.30 E9/L    Immature Granulocytes # 0.04 E9/L    Lymphocytes Absolute 1.28 (L) 1.50 - 4.00 E9/L    Monocytes Absolute 0.70 0.10 - 0.95 E9/L    Eosinophils Absolute 0.08 0.05 - 0.50 E9/L    Basophils Absolute 0.09 0.00 - 0.20 E9/L   Lipase   Result Value Ref Range    Lipase 20 13 - 60 U/L   Lactic Acid   Result Value Ref Range    Lactic Acid 0.8 0.5 - 2.2 mmol/L   Urinalysis with Microscopic   Result Value Ref Range    Color, UA Yellow Straw/Yellow    Clarity, UA Clear Clear    Glucose, Ur Negative Negative mg/dL    Bilirubin Urine Negative Negative    Ketones, Urine TRACE (A) Negative mg/dL    Specific Gravity, UA 1.025 1.005 - 1.030    Blood, Urine Negative Negative    pH, UA 6.0 5.0 - 9.0    Protein, UA Negative Negative mg/dL    Urobilinogen, Urine 0.2 <2.0 E.U./dL    Nitrite, Urine Negative Negative    Leukocyte Esterase, Urine Negative Negative    WBC, UA NONE 0 - 5 /HPF    RBC, UA NONE 0 - 2 /HPF    Bacteria, UA NONE SEEN None Seen /HPF     Imaging: All Radiology results interpreted by Radiologist unless otherwise noted. CT ABDOMEN PELVIS W IV CONTRAST Additional Contrast? None   Preliminary Result   No CT evidence of acute intra-abdominal or pelvic abnormality.              ED Course / Medical Decision Making     Medications   ondansetron [facility]) injection 4 mg (4 mg IntraVENous Given 4/17/22 1249)   0.9 % sodium chloride bolus (0 mLs IntraVENous Stopped 4/17/22 1422)   pantoprazole (PROTONIX) injection 40 mg (40 mg IntraVENous Given 4/17/22 1248)   aluminum & magnesium hydroxide-simethicone (MAALOX) 30 mL, lidocaine viscous hcl (XYLOCAINE) 5 mL (GI COCKTAIL) ( Oral Given 4/17/22 0884)   iopamidol (ISOVUE-370) 76 % injection 75 mL (75 mLs IntraVENous Given 4/17/22 1345)      Re-Evaluations:  4/17/22      Time: 1315    Patients condition is improving. Will order CT    Time: 1400  Results discussed. Patient updated on the plan. Consultations:             None    Procedures:   none    MDM: Patient presents to the emergency department for epigastric pain with nausea and diarrhea. The patient's diagnostic work-up is reassuring. Patient updated on all results. Patient's vital signs are normal.  He remained well-appearing throughout his entire emergency department stay. He had significant improvement of his symptoms with the medications above. Due to his history of H. pylori, he will be given the prescriptions below. Patient educated on new medications. Patient to follow-up with his gastroenterologist, Dr. Nadir Johnson. Return to the emergency department for any new or worsening symptoms. Plan of Care/Counseling:  Traci Dyer PA-C  reviewed today's visit with the patient in addition to providing specific details for the plan of care and counseling regarding the diagnosis and prognosis. Questions are answered at this time and are agreeable with the plan. Assessment      1. Abdominal pain, epigastric    2. History of Helicobacter pylori infection      This patient's ED course included: a personal history and physicial examination, re-evaluation prior to disposition, multiple bedside re-evaluations and IV medications  This patient has remained hemodynamically stable, improved and been closely monitored during their ED course. Plan   Discharged home  Patient condition is good.     New Medications     Discharge Medication List as of 4/17/2022  2:15 PM      START taking these medications    Details   bismuth subsalicylate (PINK BISMUTH MAXIMUM STRENGTH) 525 MG/15ML suspension Take 15 mLs by mouth every 6 hours as needed (H. pylori treatment), Disp-420 mL, R-0Print      metroNIDAZOLE (FLAGYL) 500 MG tablet Take 1 tablet by mouth 4 times daily for 14 days, Disp-56 tablet, R-0Print      pantoprazole (PROTONIX) 40 MG tablet Take 1 tablet by mouth daily for 14 days, Disp-14 tablet, R-0Print      tetracycline (ACHROMYCIN;SUMYCIN) 500 MG capsule Take 1 capsule by mouth 4 times daily for 14 days, Disp-56 capsule, R-0Print      ondansetron (ZOFRAN ODT) 4 MG disintegrating tablet Take 1 tablet by mouth every 8 hours as needed for Nausea or Vomiting, Disp-24 tablet, R-0Print           Electronically signed by Boni Ward PA-C   DD: 4/17/22  **This report was transcribed using voice recognition software. Every effort was made to ensure accuracy; however, inadvertent computerized transcription errors may be present.   END OF PROVIDER NOTE       Boni Ward PA-C  04/17/22 8186

## 2022-04-17 NOTE — Clinical Note
Drew Cervantes was seen and treated in our emergency department on 4/17/2022. He may return to work on 04/18/2022. If you have any questions or concerns, please don't hesitate to call.       Akiko Klein PA-C

## 2022-07-26 ENCOUNTER — HOSPITAL ENCOUNTER (OUTPATIENT)
Age: 42
Setting detail: OBSERVATION
Discharge: LEFT BEFORE DISCHARGE | End: 2022-07-27
Attending: EMERGENCY MEDICINE | Admitting: SURGERY
Payer: MEDICAID

## 2022-07-26 DIAGNOSIS — R10.84 GENERALIZED ABDOMINAL PAIN: ICD-10-CM

## 2022-07-26 DIAGNOSIS — K56.1 INTUSSUSCEPTION (HCC): Primary | ICD-10-CM

## 2022-07-26 PROCEDURE — 99285 EMERGENCY DEPT VISIT HI MDM: CPT

## 2022-07-26 PROCEDURE — 36415 COLL VENOUS BLD VENIPUNCTURE: CPT

## 2022-07-26 PROCEDURE — 83605 ASSAY OF LACTIC ACID: CPT

## 2022-07-26 PROCEDURE — 85025 COMPLETE CBC W/AUTO DIFF WBC: CPT

## 2022-07-26 PROCEDURE — 96374 THER/PROPH/DIAG INJ IV PUSH: CPT

## 2022-07-26 PROCEDURE — 96375 TX/PRO/DX INJ NEW DRUG ADDON: CPT

## 2022-07-26 PROCEDURE — 83690 ASSAY OF LIPASE: CPT

## 2022-07-26 PROCEDURE — 80053 COMPREHEN METABOLIC PANEL: CPT

## 2022-07-26 RX ORDER — 0.9 % SODIUM CHLORIDE 0.9 %
1000 INTRAVENOUS SOLUTION INTRAVENOUS ONCE
Status: COMPLETED | OUTPATIENT
Start: 2022-07-27 | End: 2022-07-27

## 2022-07-26 RX ORDER — SODIUM CHLORIDE 0.9 % (FLUSH) 0.9 %
10 SYRINGE (ML) INJECTION PRN
Status: DISCONTINUED | OUTPATIENT
Start: 2022-07-26 | End: 2022-07-27 | Stop reason: HOSPADM

## 2022-07-26 RX ORDER — ONDANSETRON 2 MG/ML
4 INJECTION INTRAMUSCULAR; INTRAVENOUS ONCE
Status: COMPLETED | OUTPATIENT
Start: 2022-07-27 | End: 2022-07-27

## 2022-07-26 ASSESSMENT — ENCOUNTER SYMPTOMS
DIARRHEA: 0
HEMATOCHEZIA: 0
BACK PAIN: 0
ABDOMINAL PAIN: 1
EYE REDNESS: 0
CONSTIPATION: 0
COUGH: 0
HEMATEMESIS: 0
EYE DISCHARGE: 0
FLATUS: 0
VOMITING: 1
EYE PAIN: 0
SINUS PRESSURE: 0
NAUSEA: 1
SORE THROAT: 0
BELCHING: 0
SHORTNESS OF BREATH: 0
WHEEZING: 0

## 2022-07-26 ASSESSMENT — PAIN SCALES - GENERAL: PAINLEVEL_OUTOF10: 8

## 2022-07-26 ASSESSMENT — PAIN - FUNCTIONAL ASSESSMENT: PAIN_FUNCTIONAL_ASSESSMENT: 0-10

## 2022-07-27 ENCOUNTER — APPOINTMENT (OUTPATIENT)
Dept: CT IMAGING | Age: 42
End: 2022-07-27
Payer: MEDICAID

## 2022-07-27 VITALS
TEMPERATURE: 98.6 F | BODY MASS INDEX: 23.7 KG/M2 | HEART RATE: 66 BPM | RESPIRATION RATE: 16 BRPM | HEIGHT: 72 IN | OXYGEN SATURATION: 100 % | SYSTOLIC BLOOD PRESSURE: 138 MMHG | WEIGHT: 175 LBS | DIASTOLIC BLOOD PRESSURE: 85 MMHG

## 2022-07-27 PROBLEM — K56.1 INTUSSUSCEPTION (HCC): Status: ACTIVE | Noted: 2022-07-27

## 2022-07-27 LAB
ALBUMIN SERPL-MCNC: 4.6 G/DL (ref 3.5–5.2)
ALP BLD-CCNC: 60 U/L (ref 40–129)
ALT SERPL-CCNC: 43 U/L (ref 0–40)
ANION GAP SERPL CALCULATED.3IONS-SCNC: 9 MMOL/L (ref 7–16)
AST SERPL-CCNC: 32 U/L (ref 0–39)
BASOPHILS ABSOLUTE: 0.09 E9/L (ref 0–0.2)
BASOPHILS RELATIVE PERCENT: 1 % (ref 0–2)
BILIRUB SERPL-MCNC: 0.4 MG/DL (ref 0–1.2)
BUN BLDV-MCNC: 19 MG/DL (ref 6–20)
CALCIUM SERPL-MCNC: 9.2 MG/DL (ref 8.6–10.2)
CHLORIDE BLD-SCNC: 104 MMOL/L (ref 98–107)
CO2: 28 MMOL/L (ref 22–29)
CREAT SERPL-MCNC: 0.9 MG/DL (ref 0.7–1.2)
EOSINOPHILS ABSOLUTE: 0.1 E9/L (ref 0.05–0.5)
EOSINOPHILS RELATIVE PERCENT: 1.1 % (ref 0–6)
GFR AFRICAN AMERICAN: >60
GFR NON-AFRICAN AMERICAN: >60 ML/MIN/1.73
GLUCOSE BLD-MCNC: 136 MG/DL (ref 74–99)
HCT VFR BLD CALC: 41.6 % (ref 37–54)
HEMOGLOBIN: 14 G/DL (ref 12.5–16.5)
IMMATURE GRANULOCYTES #: 0.02 E9/L
IMMATURE GRANULOCYTES %: 0.2 % (ref 0–5)
LACTIC ACID: 1.3 MMOL/L (ref 0.5–2.2)
LIPASE: 15 U/L (ref 13–60)
LYMPHOCYTES ABSOLUTE: 1.38 E9/L (ref 1.5–4)
LYMPHOCYTES RELATIVE PERCENT: 15.4 % (ref 20–42)
MCH RBC QN AUTO: 30 PG (ref 26–35)
MCHC RBC AUTO-ENTMCNC: 33.7 % (ref 32–34.5)
MCV RBC AUTO: 89.1 FL (ref 80–99.9)
MONOCYTES ABSOLUTE: 0.46 E9/L (ref 0.1–0.95)
MONOCYTES RELATIVE PERCENT: 5.1 % (ref 2–12)
NEUTROPHILS ABSOLUTE: 6.92 E9/L (ref 1.8–7.3)
NEUTROPHILS RELATIVE PERCENT: 77.2 % (ref 43–80)
PDW BLD-RTO: 12.2 FL (ref 11.5–15)
PLATELET # BLD: 290 E9/L (ref 130–450)
PMV BLD AUTO: 10.1 FL (ref 7–12)
POTASSIUM SERPL-SCNC: 4 MMOL/L (ref 3.5–5)
RBC # BLD: 4.67 E12/L (ref 3.8–5.8)
SODIUM BLD-SCNC: 141 MMOL/L (ref 132–146)
TOTAL PROTEIN: 6.9 G/DL (ref 6.4–8.3)
WBC # BLD: 9 E9/L (ref 4.5–11.5)

## 2022-07-27 PROCEDURE — 96374 THER/PROPH/DIAG INJ IV PUSH: CPT

## 2022-07-27 PROCEDURE — 6360000004 HC RX CONTRAST MEDICATION: Performed by: RADIOLOGY

## 2022-07-27 PROCEDURE — 6370000000 HC RX 637 (ALT 250 FOR IP): Performed by: STUDENT IN AN ORGANIZED HEALTH CARE EDUCATION/TRAINING PROGRAM

## 2022-07-27 PROCEDURE — G0378 HOSPITAL OBSERVATION PER HR: HCPCS

## 2022-07-27 PROCEDURE — 6370000000 HC RX 637 (ALT 250 FOR IP): Performed by: EMERGENCY MEDICINE

## 2022-07-27 PROCEDURE — 96376 TX/PRO/DX INJ SAME DRUG ADON: CPT

## 2022-07-27 PROCEDURE — 2500000003 HC RX 250 WO HCPCS: Performed by: EMERGENCY MEDICINE

## 2022-07-27 PROCEDURE — 2580000003 HC RX 258: Performed by: SURGERY

## 2022-07-27 PROCEDURE — 6360000002 HC RX W HCPCS: Performed by: SURGERY

## 2022-07-27 PROCEDURE — 6370000000 HC RX 637 (ALT 250 FOR IP): Performed by: SURGERY

## 2022-07-27 PROCEDURE — 6360000002 HC RX W HCPCS: Performed by: EMERGENCY MEDICINE

## 2022-07-27 PROCEDURE — 74177 CT ABD & PELVIS W/CONTRAST: CPT

## 2022-07-27 PROCEDURE — C9113 INJ PANTOPRAZOLE SODIUM, VIA: HCPCS | Performed by: SURGERY

## 2022-07-27 PROCEDURE — 2580000003 HC RX 258: Performed by: EMERGENCY MEDICINE

## 2022-07-27 PROCEDURE — A4216 STERILE WATER/SALINE, 10 ML: HCPCS | Performed by: SURGERY

## 2022-07-27 PROCEDURE — 99283 EMERGENCY DEPT VISIT LOW MDM: CPT | Performed by: SURGERY

## 2022-07-27 PROCEDURE — 96375 TX/PRO/DX INJ NEW DRUG ADDON: CPT

## 2022-07-27 PROCEDURE — A4216 STERILE WATER/SALINE, 10 ML: HCPCS | Performed by: EMERGENCY MEDICINE

## 2022-07-27 PROCEDURE — 74176 CT ABD & PELVIS W/O CONTRAST: CPT

## 2022-07-27 RX ORDER — OXYCODONE HYDROCHLORIDE 5 MG/1
5 TABLET ORAL EVERY 4 HOURS PRN
Status: DISCONTINUED | OUTPATIENT
Start: 2022-07-27 | End: 2022-07-27 | Stop reason: HOSPADM

## 2022-07-27 RX ORDER — SODIUM CHLORIDE 0.9 % (FLUSH) 0.9 %
10 SYRINGE (ML) INJECTION PRN
Status: DISCONTINUED | OUTPATIENT
Start: 2022-07-27 | End: 2022-07-27 | Stop reason: HOSPADM

## 2022-07-27 RX ORDER — IBUPROFEN 800 MG/1
1 TABLET ORAL 3 TIMES DAILY
COMMUNITY
End: 2022-11-02

## 2022-07-27 RX ORDER — SUCRALFATE 1 G/1
1 TABLET ORAL
Status: DISCONTINUED | OUTPATIENT
Start: 2022-07-27 | End: 2022-07-27 | Stop reason: HOSPADM

## 2022-07-27 RX ORDER — SODIUM CHLORIDE 0.9 % (FLUSH) 0.9 %
10 SYRINGE (ML) INJECTION EVERY 12 HOURS SCHEDULED
Status: DISCONTINUED | OUTPATIENT
Start: 2022-07-27 | End: 2022-07-27 | Stop reason: HOSPADM

## 2022-07-27 RX ORDER — MORPHINE SULFATE 10 MG/ML
6 INJECTION, SOLUTION INTRAMUSCULAR; INTRAVENOUS ONCE
Status: COMPLETED | OUTPATIENT
Start: 2022-07-27 | End: 2022-07-27

## 2022-07-27 RX ORDER — ONDANSETRON 2 MG/ML
4 INJECTION INTRAMUSCULAR; INTRAVENOUS EVERY 6 HOURS PRN
Status: DISCONTINUED | OUTPATIENT
Start: 2022-07-27 | End: 2022-07-27 | Stop reason: HOSPADM

## 2022-07-27 RX ORDER — SODIUM CHLORIDE, SODIUM LACTATE, POTASSIUM CHLORIDE, CALCIUM CHLORIDE 600; 310; 30; 20 MG/100ML; MG/100ML; MG/100ML; MG/100ML
INJECTION, SOLUTION INTRAVENOUS CONTINUOUS
Status: DISCONTINUED | OUTPATIENT
Start: 2022-07-27 | End: 2022-07-27 | Stop reason: HOSPADM

## 2022-07-27 RX ORDER — ONDANSETRON 4 MG/1
4 TABLET, ORALLY DISINTEGRATING ORAL EVERY 8 HOURS PRN
Status: DISCONTINUED | OUTPATIENT
Start: 2022-07-27 | End: 2022-07-27 | Stop reason: HOSPADM

## 2022-07-27 RX ORDER — SODIUM CHLORIDE 9 MG/ML
INJECTION, SOLUTION INTRAVENOUS PRN
Status: DISCONTINUED | OUTPATIENT
Start: 2022-07-27 | End: 2022-07-27 | Stop reason: HOSPADM

## 2022-07-27 RX ORDER — MORPHINE SULFATE 2 MG/ML
2 INJECTION, SOLUTION INTRAMUSCULAR; INTRAVENOUS EVERY 4 HOURS PRN
Status: DISCONTINUED | OUTPATIENT
Start: 2022-07-27 | End: 2022-07-27 | Stop reason: HOSPADM

## 2022-07-27 RX ORDER — ENOXAPARIN SODIUM 100 MG/ML
40 INJECTION SUBCUTANEOUS DAILY
Status: DISCONTINUED | OUTPATIENT
Start: 2022-07-27 | End: 2022-07-27 | Stop reason: HOSPADM

## 2022-07-27 RX ORDER — MORPHINE SULFATE 4 MG/ML
4 INJECTION, SOLUTION INTRAMUSCULAR; INTRAVENOUS EVERY 4 HOURS PRN
Status: DISCONTINUED | OUTPATIENT
Start: 2022-07-27 | End: 2022-07-27 | Stop reason: HOSPADM

## 2022-07-27 RX ADMIN — IOHEXOL 50 ML: 240 INJECTION, SOLUTION INTRATHECAL; INTRAVASCULAR; INTRAVENOUS; ORAL at 13:25

## 2022-07-27 RX ADMIN — PIPERACILLIN AND TAZOBACTAM 3375 MG: 3; .375 INJECTION, POWDER, LYOPHILIZED, FOR SOLUTION INTRAVENOUS at 17:47

## 2022-07-27 RX ADMIN — SUCRALFATE 1 G: 1 TABLET ORAL at 08:31

## 2022-07-27 RX ADMIN — SUCRALFATE 1 G: 1 TABLET ORAL at 17:47

## 2022-07-27 RX ADMIN — ONDANSETRON 4 MG: 2 INJECTION INTRAMUSCULAR; INTRAVENOUS at 18:17

## 2022-07-27 RX ADMIN — MAGNESIUM CITRATE 296 ML: 1.75 LIQUID ORAL at 08:31

## 2022-07-27 RX ADMIN — OXYCODONE 5 MG: 5 TABLET ORAL at 14:20

## 2022-07-27 RX ADMIN — OXYCODONE 5 MG: 5 TABLET ORAL at 08:30

## 2022-07-27 RX ADMIN — ALUMINUM HYDROXIDE, MAGNESIUM HYDROXIDE, AND SIMETHICONE: 200; 200; 20 SUSPENSION ORAL at 01:54

## 2022-07-27 RX ADMIN — SODIUM CHLORIDE 1000 ML: 9 INJECTION, SOLUTION INTRAVENOUS at 00:03

## 2022-07-27 RX ADMIN — FAMOTIDINE 20 MG: 10 INJECTION, SOLUTION INTRAVENOUS at 00:04

## 2022-07-27 RX ADMIN — ONDANSETRON 4 MG: 2 INJECTION INTRAMUSCULAR; INTRAVENOUS at 00:04

## 2022-07-27 RX ADMIN — SUCRALFATE 1 G: 1 TABLET ORAL at 14:20

## 2022-07-27 RX ADMIN — SODIUM CHLORIDE 40 MG: 9 INJECTION, SOLUTION INTRAMUSCULAR; INTRAVENOUS; SUBCUTANEOUS at 11:01

## 2022-07-27 RX ADMIN — IOPAMIDOL 50 ML: 755 INJECTION, SOLUTION INTRAVENOUS at 13:25

## 2022-07-27 RX ADMIN — MORPHINE SULFATE 6 MG: 10 INJECTION, SOLUTION INTRAMUSCULAR; INTRAVENOUS at 03:29

## 2022-07-27 RX ADMIN — SODIUM CHLORIDE, POTASSIUM CHLORIDE, SODIUM LACTATE AND CALCIUM CHLORIDE: 600; 310; 30; 20 INJECTION, SOLUTION INTRAVENOUS at 11:00

## 2022-07-27 ASSESSMENT — LIFESTYLE VARIABLES
HOW MANY STANDARD DRINKS CONTAINING ALCOHOL DO YOU HAVE ON A TYPICAL DAY: PATIENT DOES NOT DRINK
HOW OFTEN DO YOU HAVE A DRINK CONTAINING ALCOHOL: MONTHLY OR LESS

## 2022-07-27 ASSESSMENT — PAIN SCALES - GENERAL
PAINLEVEL_OUTOF10: 10
PAINLEVEL_OUTOF10: 6
PAINLEVEL_OUTOF10: 6

## 2022-07-27 ASSESSMENT — PAIN DESCRIPTION - LOCATION
LOCATION: ABDOMEN
LOCATION: ABDOMEN

## 2022-07-27 ASSESSMENT — PAIN DESCRIPTION - ORIENTATION
ORIENTATION: MID;UPPER
ORIENTATION: MID

## 2022-07-27 ASSESSMENT — PAIN DESCRIPTION - DESCRIPTORS
DESCRIPTORS: BURNING;ACHING
DESCRIPTORS: BURNING

## 2022-07-27 NOTE — DISCHARGE INSTRUCTIONS
Your information:  Name: Zabrina Cuellar  : 1980    Your instructions:    Discharge AMA. Follow up with primary care provider and specialists as needed. Signs and symptoms to look out for:  Call 911  anytime you think you may need emergency care. For example, call if:    You vomit blood or what looks like coffee grounds. You pass maroon or very bloody stools. Call your doctor now or seek immediate medical care if:    You have new or worse belly pain. Your stools are black and look like tar, or they have streaks of blood. You vomit. Watch closely for changes in your health, and be sure to contact your doctor if:    You do not get better as expected. What to do after you leave the hospital:    Recommended diet:  bland diet    Recommended activity: activity as tolerated        The following personal items were collected during your admission and were returned to you:    Belongings  Dental Appliances: Uppers  Vision - Corrective Lenses: None  Hearing Aid: None  Clothing: Shirt, Pants, Undergarments, Footwear, Socks, Belt  Jewelry: None  Body Piercings Removed: No  Electronic Devices: Cell Phone,   Weapons (Notify Protective Services/Security): None  Other Valuables: Money  Home Medications: None  Valuables Given To: Patient  Patient approves for provider to speak to responsible person post operatively: Yes    Information obtained by:  By signing below, I understand that if any problems occur once I leave the hospital I am to contact ROCHELLE Gallagher CNP. I understand and acknowledge receipt of the instructions indicated above.

## 2022-07-27 NOTE — PROGRESS NOTES
CT with terminal ileal thickening without obstruction. Intussusception resolved but radiology concerns for ischemia and worsening appearance. GERD diet. Await GI input. PPI, carafate. Will start Zosyn. Will need endoscopy likely outpatient.     Jose Francisco Robert MD, MS  Minimally Invasive and Bariatric Surgery  [phone] (p)  7/27/2022  3:26 PM

## 2022-07-27 NOTE — PROGRESS NOTES
Patient requested to be discharged. Physician notified and stated he would possibly discharge patient after looking at the CT results. Physician spoke to this nurse about CT results and stated to relay to the patient that he would be unable to be officially discharged. Patient updated. Patient decided to sign out AMA. Physician notified.

## 2022-07-27 NOTE — ED TRIAGE NOTES
Pt to ED for abd pain since 1200 today, reports bilat intermittent pressure that starts towards the top of his abd and moves down to lower abd with some n/v.

## 2022-07-27 NOTE — ED PROVIDER NOTES
Patient reports onset of abdominal pain about 12 hours ago. He states it comes on every 15 or 20 minutes and can be quite severe at times. He describes associated nausea and one episode of emesis. No diarrhea nor constipation. Last stool was this morning. The history is provided by the patient. Abdominal Pain  Pain location:  Epigastric  Pain quality: aching    Pain radiates to:  Does not radiate  Pain severity:  Severe  Onset quality:  Sudden  Duration:  12 hours  Timing:  Constant  Progression:  Waxing and waning  Chronicity:  New  Relieved by:  None tried  Worsened by:  Nothing  Ineffective treatments:  None tried  Associated symptoms: nausea and vomiting    Associated symptoms: no anorexia, no belching, no chest pain, no chills, no constipation, no cough, no diarrhea, no dysuria, no fatigue, no fever, no flatus, no hematemesis, no hematochezia, no hematuria, no melena, no shortness of breath and no sore throat       Review of Systems   Constitutional:  Negative for chills, fatigue and fever. HENT:  Negative for sinus pressure and sore throat. Eyes:  Negative for pain, discharge and redness. Respiratory:  Negative for cough, shortness of breath and wheezing. Cardiovascular:  Negative for chest pain. Gastrointestinal:  Positive for abdominal pain, nausea and vomiting. Negative for anorexia, constipation, diarrhea, flatus, hematemesis, hematochezia and melena. Genitourinary:  Negative for dysuria, frequency and hematuria. Musculoskeletal:  Negative for arthralgias and back pain. Skin:  Negative for rash and wound. Neurological:  Negative for weakness and headaches. Hematological:  Negative for adenopathy. All other systems reviewed and are negative. Physical Exam  Vitals and nursing note reviewed. Constitutional:       Appearance: He is well-developed. HENT:      Head: Normocephalic and atraumatic. Eyes:      Pupils: Pupils are equal, round, and reactive to light. Cardiovascular:      Rate and Rhythm: Normal rate and regular rhythm. Heart sounds: Normal heart sounds. No murmur heard. Pulmonary:      Effort: Pulmonary effort is normal. No respiratory distress. Breath sounds: Normal breath sounds. No wheezing or rales. Abdominal:      General: Bowel sounds are normal.      Palpations: Abdomen is soft. Tenderness: There is abdominal tenderness in the epigastric area. There is no guarding or rebound. Musculoskeletal:      Cervical back: Normal range of motion and neck supple. Skin:     General: Skin is warm and dry. Neurological:      Mental Status: He is alert and oriented to person, place, and time. Cranial Nerves: No cranial nerve deficit. Coordination: Coordination normal.        Procedures     MDM       2:57 AM EDT  Discussed results. Patient is still having significant pain. Orders placed. Discussed admission. He is willing. 5:43 AM EDT  Discussed with Dr. Sukumar Rodrigues. OK for admisison. --------------------------------------------- PAST HISTORY ---------------------------------------------  Past Medical History:  has a past medical history of Arm pain, Hepatitis, Neck pain, Osteoarthritis of spine with radiculopathy, cervical region, and Shoulder pain. Past Surgical History:  has no past surgical history on file. Social History:  reports that he has been smoking cigarettes. He has been smoking an average of 1 pack per day. He has quit using smokeless tobacco. He reports current alcohol use. He reports current drug use. Frequency: 7.00 times per week. Drug: Marijuana Nohemi [place]). Family History: family history includes Diabetes in his mother; Heart Disease in his father; High Blood Pressure in his mother. The patients home medications have been reviewed. Allergies: Patient has no known allergies.     -------------------------------------------------- RESULTS -------------------------------------------------    LABS:  Results for orders placed or performed during the hospital encounter of 07/26/22   CBC with Auto Differential   Result Value Ref Range    WBC 9.0 4.5 - 11.5 E9/L    RBC 4.67 3.80 - 5.80 E12/L    Hemoglobin 14.0 12.5 - 16.5 g/dL    Hematocrit 41.6 37.0 - 54.0 %    MCV 89.1 80.0 - 99.9 fL    MCH 30.0 26.0 - 35.0 pg    MCHC 33.7 32.0 - 34.5 %    RDW 12.2 11.5 - 15.0 fL    Platelets 126 220 - 484 E9/L    MPV 10.1 7.0 - 12.0 fL    Neutrophils % 77.2 43.0 - 80.0 %    Immature Granulocytes % 0.2 0.0 - 5.0 %    Lymphocytes % 15.4 (L) 20.0 - 42.0 %    Monocytes % 5.1 2.0 - 12.0 %    Eosinophils % 1.1 0.0 - 6.0 %    Basophils % 1.0 0.0 - 2.0 %    Neutrophils Absolute 6.92 1.80 - 7.30 E9/L    Immature Granulocytes # 0.02 E9/L    Lymphocytes Absolute 1.38 (L) 1.50 - 4.00 E9/L    Monocytes Absolute 0.46 0.10 - 0.95 E9/L    Eosinophils Absolute 0.10 0.05 - 0.50 E9/L    Basophils Absolute 0.09 0.00 - 0.20 E9/L   Comprehensive Metabolic Panel   Result Value Ref Range    Sodium 141 132 - 146 mmol/L    Potassium 4.0 3.5 - 5.0 mmol/L    Chloride 104 98 - 107 mmol/L    CO2 28 22 - 29 mmol/L    Anion Gap 9 7 - 16 mmol/L    Glucose 136 (H) 74 - 99 mg/dL    BUN 19 6 - 20 mg/dL    Creatinine 0.9 0.7 - 1.2 mg/dL    GFR Non-African American >60 >=60 mL/min/1.73    GFR African American >60     Calcium 9.2 8.6 - 10.2 mg/dL    Total Protein 6.9 6.4 - 8.3 g/dL    Albumin 4.6 3.5 - 5.2 g/dL    Total Bilirubin 0.4 0.0 - 1.2 mg/dL    Alkaline Phosphatase 60 40 - 129 U/L    ALT 43 (H) 0 - 40 U/L    AST 32 0 - 39 U/L   Lipase   Result Value Ref Range    Lipase 15 13 - 60 U/L   Lactic Acid   Result Value Ref Range    Lactic Acid 1.3 0.5 - 2.2 mmol/L       RADIOLOGY:  CT ABDOMEN PELVIS WO CONTRAST Additional Contrast? None   Final Result   There appears to be a short segment ileocolic intussusception.   Dilatation   and fecalization of the distal small bowel suggests at least partial obstruction.               ------------------------- NURSING NOTES AND VITALS REVIEWED ---------------------------  Date / Time Roomed:  7/26/2022 11:42 PM  ED Bed Assignment:  05/05    The nursing notes within the ED encounter and vital signs as below have been reviewed. Patient Vitals for the past 24 hrs:   BP Temp Temp src Pulse Resp SpO2 Height Weight   07/27/22 0331 (!) 151/92 -- -- 72 16 99 % -- --   07/26/22 2345 -- 98.3 °F (36.8 °C) -- 63 18 99 % 6' (1.829 m) 175 lb (79.4 kg)   07/26/22 2340 135/85 97.9 °F (36.6 °C) Oral (!) 105 18 97 % 6' (1.829 m) 180 lb (81.6 kg)       Oxygen Saturation Interpretation: Normal    ------------------------------------------ PROGRESS NOTES ------------------------------------------  Re-evaluation(s):  Time: 2962  Patients symptoms show no change  Repeat physical examination is not changed    Counseling:  I have spoken with the patient and discussed todays results, in addition to providing specific details for the plan of care and counseling regarding the diagnosis and prognosis. Their questions are answered at this time and they are agreeable with the plan of admission.    --------------------------------- ADDITIONAL PROVIDER NOTES ---------------------------------  Consultations:  Time: 2108. Spoke with Dr. Emily Stacy. Discussed case. They will admit the patient. This patient's ED course included: a personal history and physicial examination, multiple bedside re-evaluations, IV medications, cardiac monitoring, and continuous pulse oximetry    This patient has remained hemodynamically stable during their ED course. Diagnosis:  1. Intussusception ([address]    2. Generalized abdominal pain        Disposition:  Patient's disposition: Admit to med/surg floor  Patient's condition is stable.          Caitie Mathew [place]  07/27/22 6247

## 2022-07-27 NOTE — H&P
GENERAL SURGERY  HISTORY AND PHYSICAL  7/27/2022    Chief Complaint   Patient presents with    Abdominal Pain       HPI  Joyce Anguiano is a 40 y.o. male who presents for evaluation of abdominal pain. He states he has had intermittent burning epigastric and RUQ abdominal pain for the last few weeks that acutely worsened yesterday. Worse with food. Denies any n/v/d. Denies melena or hematochezia. Had normal BM yesterday. He has a history of hep C that he states he cleared without treatment. Also has history of h.pylori that he was treated for years ago. He admits to taking ibuprofen multiple times per day and is a current every day smoker. He is afebrile and hemodynamically stable. CT scan read as possible intussusception at ileocecal valve but no RLQ tenderness on exam.       Past Medical History:   Diagnosis Date    Arm pain     arm pain and numbness    Hepatitis     Neck pain     Osteoarthritis of spine with radiculopathy, cervical region 3/17/2016    Shoulder pain        No past surgical history on file. Prior to Admission medications    Medication Sig Start Date End Date Taking? Authorizing Provider   ondansetron (ZOFRAN ODT) 4 MG disintegrating tablet Take 1 tablet by mouth every 8 hours as needed for Nausea or Vomiting 4/17/22   Conner Pruitt PA-C   ibuprofen (ADVIL;MOTRIN) 800 MG tablet Take 800 mg by mouth every 8 hours as needed for Pain  4/17/22  Historical Provider, MD   traMADol (ULTRAM) 50 MG tablet Take 50 mg by mouth every 6 hours as needed for Pain. Historical Provider, MD       No Known Allergies    Family History   Problem Relation Age of Onset    Diabetes Mother     High Blood Pressure Mother     Heart Disease Father        Social History     Tobacco Use    Smoking status: Every Day     Packs/day: 1.00     Types: Cigarettes    Smokeless tobacco: Former   Vaping Use    Vaping Use: Every day    Substances: THC   Substance Use Topics    Alcohol use:  Yes     Alcohol/week: 0.0 standard drinks Comment: rarely. drinks 6 to 7 red bulls a day. about 4 coke daily. Drug use: Yes     Frequency: 7.0 times per week     Types: Marijuana Marisol Eglin)     Comment: medical         Review of Systems - History obtained from the patient  General ROS: negative  Psychological ROS: negative  Ophthalmic ROS: negative  ENT ROS: negative  Allergy and Immunology ROS: negative  Hematological and Lymphatic ROS: negative  Endocrine ROS: negative  Respiratory ROS: negative  Cardiovascular ROS: negative  Gastrointestinal ROS: positive for - abdominal pain and heartburn  Genito-Urinary ROS: negative  Musculoskeletal ROS: negative  Neurological ROS: negative  Dermatological ROS: negative      PHYSICAL EXAM:    Vitals:    07/27/22 0625   BP: (!) 145/79   Pulse: 50   Resp: 16   Temp: 98.1 °F (36.7 °C)   SpO2: 98%       General Appearance:  awake, alert, oriented, in no acute distress  Skin:  Skin color, texture, turgor normal. No rashes or lesions. Head/face:  NCAT  Eyes:  No gross abnormalities. Lungs:  Normal expansion. Clear to auscultation. Heart:  Heart regular rate   Abdomen:  Soft, non-distended. Mild epigastric/RUQ tenderness  Extremities: Extremities warm to touch, pink, with no edema. Neurologic:  negative    LABS:  CBC  Recent Labs     07/26/22  2355   WBC 9.0   HGB 14.0   HCT 41.6        BMP  Recent Labs     07/26/22  2355      K 4.0      CO2 28   BUN 19   CREATININE 0.9   CALCIUM 9.2     Liver Function  Recent Labs     07/26/22  2355   LIPASE 15   BILITOT 0.4   AST 32   ALT 43*   ALKPHOS 60   PROT 6.9   LABALBU 4.6     No results for input(s): LACTATE in the last 72 hours. No results for input(s): INR, PTT in the last 72 hours.     Invalid input(s): PT    RADIOLOGY    CT ABDOMEN PELVIS WO CONTRAST Additional Contrast? None    Result Date: 7/27/2022  EXAMINATION: CT OF THE ABDOMEN AND PELVIS WITHOUT CONTRAST 7/27/2022 1:38 am TECHNIQUE: CT of the abdomen and pelvis was performed without the administration of intravenous contrast. Multiplanar reformatted images are provided for review. Automated exposure control, iterative reconstruction, and/or weight based adjustment of the mA/kV was utilized to reduce the radiation dose to as low as reasonably achievable. COMPARISON: 04/17/2022 HISTORY: ORDERING SYSTEM PROVIDED HISTORY: upper abdominal pain TECHNOLOGIST PROVIDED HISTORY: Additional Contrast?->None Reason for exam:->upper abdominal pain Decision Support Exception - unselect if not a suspected or confirmed emergency medical condition->Emergency Medical Condition (MA) FINDINGS: Lower Chest: Minimal dependent atelectasis. No consolidation or effusion. Organs: Liver, gallbladder, spleen, pancreas, kidneys and adrenals and kidneys demonstrate no acute abnormality. No nephrolithiasis or urinary collecting system dilatation. GI/Bowel: Dilatation of the distal small bowel measuring approximately 3.4 cm in diameter. There appears to be a short segment ileocolic intussusception. Fecalization noted of the distal small bowel. Normal appendix. Pelvis: Urinary bladder unremarkable. Peritoneum/Retroperitoneum: No free fluid. No retroperitoneal adenopathy. Abdominal aorta is normal in caliber. Bones/Soft Tissues: No acute soft tissue abnormality identified. No aggressive osseous lesion. Remote healed L1 compression fracture with moderate height loss appears unchanged. There appears to be a short segment ileocolic intussusception. Dilatation and fecalization of the distal small bowel suggests at least partial obstruction. ASSESSMENT:  40 y.o. male with burning epigastric and RUQ pain, history of h.  Pylori and hepatitis C infection    PLAN:  - do not believe this is true intussusception as patient is still having bowel function and symptoms more consistent with PUD  - GI cocktail  - PPI  - will ask GI team to evaluate due to patient history     Discussed with Dr. Juma Alonso     Electronically signed by Joanie Reilly MD on 7/27/22 at 6:51 AM EDT      General Surgery Consult Note  Betzy Allison MD, MS    Patient's Name/Date of Birth: Joseph Guzman / 1980    Date: July 27, 2022     Surgeon: Robby Macias MD    Requesting Physician:     Chief Complaint: upper abd pain    Patient Active Problem List   Diagnosis    Cervical radiculopathy    Ischemic peripheral neuropathy    Osteoarthritis of spine with radiculopathy, cervical region    Cervical disc displacement    Skin irritation    Irritant contact dermatitis    BPPV (benign paroxysmal positional vertigo)    Family hx of hypertension    Atypical chest pain    Peripheral neuropathy due to ischemia    Intussusception ([address]       Subjective: As above. I saw and examined the patient and discussed the above HPI with the resident and agree with documented positive and negative findings. Timing is constant, radiation to upper abdomen, alleviated by none and started days ago and severity is 7/10. Patient with a history of H. pylori in the past and peptic ulcer disease previously treated by GI. Presents with acute onset of upper abdominal pain. He also admitted some constipation but no bloody or dark stools. He does take routine anti-inflammatories in the form of ibuprofen for chronic back pain which he also takes narcotics. Patient is a non-smoker he states he does drink alcohol socially. Objective:  BP (!) 145/79   Pulse 50   Temp 98.1 °F (36.7 °C) (Oral)   Resp 16   Ht 6' (1.829 m)   Wt 175 lb (79.4 kg)   SpO2 98%   BMI 23.73 kg/m²   Labs:  Reviewed by me and relevant abnormalities noted       A complete 10 system review was performed and are otherwise negative unless mentioned in the above HPI. Specific negatives are listed below but may not include all those reviewed.     General ROS: negative obtundation, AMS  ENT ROS: negative rhinorrhea, epistaxis  Allergy and Immunology ROS: negative itchy/watery eyes or nasal congestion  Hematological and Lymphatic ROS: negative spontaneous bleeding or bruising  Endocrine ROS: negative  lethargy, mood swings, palpitations or polydipsia/polyuria  Respiratory ROS: negative sputum changes, stridor, tachypnea or wheezing  Cardiovascular ROS: negative for - loss of consciousness, murmur or orthopnea  Gastrointestinal ROS: negative for - hematochezia or hematemesis  Genito-Urinary ROS: negative for -  genital discharge or hematuria  Musculoskeletal ROS: negative for - focal weakness, gangrene  Psych/Neuro ROS: negative for - visual or auditory hallucinations, suicidal ideation    Physical exam:   BP (!) 145/79   Pulse 50   Temp 98.1 °F (36.7 °C) (Oral)   Resp 16   Ht 6' (1.829 m)   Wt 175 lb (79.4 kg)   SpO2 98%   BMI 23.73 kg/m²   General appearance:  NAD, appears stated age  Head: NCAT, PERRLA, EOMI, red conjunctiva  Neck: supple, no masses, trachea midline  Lungs: Equal chest rise bilateral, no retractions, no wheezing  Heart: Reg rate  Abdomen: soft, upper abdominal fullness without peritonitis some mild tenderness epigastric  Skin; warm and dry, no cyanosis  Gu: no cva tenderness  Extremities: atraumatic, no focal motor deficits, no open wounds  Psych: No tremor, visual hallucinations        Radiology: I reviewed relevant abdominal imaging from this admission and that available in the EMR including CT abd/pel from admission.  My assessment is ileocolonic intussusception with fecalization and no obvious obstruction    Assessment/Plan:  Gracie Henry is a 40 y.o. male partial small bowel obstruction, ileocolonic intussusception, history of peptic ulcer disease and H. pylori  Patient Active Problem List   Diagnosis    Cervical radiculopathy    Ischemic peripheral neuropathy    Osteoarthritis of spine with radiculopathy, cervical region    Cervical disc displacement    Skin irritation    Irritant contact dermatitis    BPPV (benign paroxysmal positional vertigo)    Family hx of

## 2022-07-28 ENCOUNTER — OFFICE VISIT (OUTPATIENT)
Dept: FAMILY MEDICINE CLINIC | Age: 42
End: 2022-07-28
Payer: MEDICAID

## 2022-07-28 VITALS
WEIGHT: 166 LBS | OXYGEN SATURATION: 94 % | BODY MASS INDEX: 22.48 KG/M2 | TEMPERATURE: 98.7 F | HEIGHT: 72 IN | HEART RATE: 92 BPM | RESPIRATION RATE: 16 BRPM | DIASTOLIC BLOOD PRESSURE: 66 MMHG | SYSTOLIC BLOOD PRESSURE: 138 MMHG

## 2022-07-28 DIAGNOSIS — R10.31 RLQ ABDOMINAL PAIN: ICD-10-CM

## 2022-07-28 DIAGNOSIS — K56.1 INTUSSUSCEPTION (HCC): ICD-10-CM

## 2022-07-28 DIAGNOSIS — R10.13 EPIGASTRIC PAIN: ICD-10-CM

## 2022-07-28 DIAGNOSIS — Z76.89 ENCOUNTER TO ESTABLISH CARE: Primary | ICD-10-CM

## 2022-07-28 PROCEDURE — 99203 OFFICE O/P NEW LOW 30 MIN: CPT | Performed by: NURSE PRACTITIONER

## 2022-07-28 RX ORDER — SUCRALFATE 1 G/1
1 TABLET ORAL 4 TIMES DAILY
Qty: 120 TABLET | Refills: 3 | Status: SHIPPED | OUTPATIENT
Start: 2022-07-28

## 2022-07-28 SDOH — ECONOMIC STABILITY: HOUSING INSECURITY
IN THE LAST 12 MONTHS, WAS THERE A TIME WHEN YOU DID NOT HAVE A STEADY PLACE TO SLEEP OR SLEPT IN A SHELTER (INCLUDING NOW)?: NO

## 2022-07-28 SDOH — ECONOMIC STABILITY: INCOME INSECURITY: IN THE LAST 12 MONTHS, WAS THERE A TIME WHEN YOU WERE NOT ABLE TO PAY THE MORTGAGE OR RENT ON TIME?: NO

## 2022-07-28 SDOH — ECONOMIC STABILITY: FOOD INSECURITY: WITHIN THE PAST 12 MONTHS, THE FOOD YOU BOUGHT JUST DIDN'T LAST AND YOU DIDN'T HAVE MONEY TO GET MORE.: NEVER TRUE

## 2022-07-28 SDOH — ECONOMIC STABILITY: FOOD INSECURITY: WITHIN THE PAST 12 MONTHS, YOU WORRIED THAT YOUR FOOD WOULD RUN OUT BEFORE YOU GOT MONEY TO BUY MORE.: NEVER TRUE

## 2022-07-28 ASSESSMENT — PATIENT HEALTH QUESTIONNAIRE - PHQ9
SUM OF ALL RESPONSES TO PHQ QUESTIONS 1-9: 2
DEPRESSION UNABLE TO ASSESS: YES
SUM OF ALL RESPONSES TO PHQ9 QUESTIONS 1 & 2: 2
2. FEELING DOWN, DEPRESSED OR HOPELESS: SEVERAL DAYS
SUM OF ALL RESPONSES TO PHQ QUESTIONS 1-9: 2
1. LITTLE INTEREST OR PLEASURE IN DOING THINGS: SEVERAL DAYS
SUM OF ALL RESPONSES TO PHQ QUESTIONS 1-9: 2
SUM OF ALL RESPONSES TO PHQ QUESTIONS 1-9: 2
2. FEELING DOWN, DEPRESSED OR HOPELESS: 1
1. LITTLE INTEREST OR PLEASURE IN DOING THINGS: 1
SUM OF ALL RESPONSES TO PHQ9 QUESTIONS 1 & 2: 2

## 2022-07-28 ASSESSMENT — LIFESTYLE VARIABLES
HOW MANY STANDARD DRINKS CONTAINING ALCOHOL DO YOU HAVE ON A TYPICAL DAY: PATIENT DOES NOT DRINK
HOW OFTEN DO YOU HAVE A DRINK CONTAINING ALCOHOL: NEVER

## 2022-07-28 ASSESSMENT — ENCOUNTER SYMPTOMS
WHEEZING: 0
TROUBLE SWALLOWING: 0
SINUS PAIN: 0
RHINORRHEA: 0
SORE THROAT: 0
CONSTIPATION: 0
SINUS PRESSURE: 0
ABDOMINAL PAIN: 1
SHORTNESS OF BREATH: 0
NAUSEA: 1
VOMITING: 0
COUGH: 0
CHEST TIGHTNESS: 0
BACK PAIN: 0
DIARRHEA: 1
FACIAL SWELLING: 0
COLOR CHANGE: 0
VOICE CHANGE: 0

## 2022-07-28 ASSESSMENT — SOCIAL DETERMINANTS OF HEALTH (SDOH): HOW HARD IS IT FOR YOU TO PAY FOR THE VERY BASICS LIKE FOOD, HOUSING, MEDICAL CARE, AND HEATING?: NOT VERY HARD

## 2022-07-28 NOTE — ASSESSMENT & PLAN NOTE
Unclear control he left AMA yesterday from [facility] and presents today hasn't been seen in 4 years. Labs reviewed, CT scans and notes from [address]. He took 1/2 tramadol this morning and 400 mg ibuprofen for his back pain. Has stopped the red bull. Referral to DR Diana Ceja and GI made today. Discussed back to ER if the pain is worsening. Discussed if the bowel gets ischemic he can end up with a lot of problems including sepsis, colostomy or even death.  Rx carafate 1 gram 4 times a day

## 2022-07-28 NOTE — ASSESSMENT & PLAN NOTE
Unclear control he left AMA yesterday from [facility] and presents today hasn't been seen in 4 years. Labs reviewed, CT scans and notes from [address]. He took 1/2 tramadol this morning and 400 mg ibuprofen for his back pain. Has stopped the red bull. Referral to DR Paz May and GI made today. Discussed back to ER if the pain is worsening. Discussed if the bowel gets ischemic he can end up with a lot of problems including sepsis, colostomy or even death.

## 2022-07-28 NOTE — Clinical Note
Patient having RLQ pain now referral to GI made today. Started Carafate.  Instructed to return to ER if pain worsens

## 2022-07-28 NOTE — PROGRESS NOTES
NEW PATIENT PROGRESS NOTE  [facility] [address]  [address] [id number]  Dept: [phone]   Chief Complaint   Patient presents with    [address] from [facility] Maintenance     Due pneu,        ASSESSMENT/PLAN   1. Encounter to establish care  2. Epigastric pain  Assessment & Plan:    Unclear control he left AMA yesterday from [facility] and presents today hasn't been seen in 4 years. Labs reviewed, CT scans and notes from [address] [place] [address],[address]. He took 1/2 tramadol this morning and 400 mg ibuprofen for his back pain. Has stopped the red bull. Referral to DR Buck Jacobson and GI made today. Discussed back to ER if the pain is worsening. Discussed if the bowel gets ischemic he can end up with a lot of problems including sepsis, colostomy or even death. Rx carafate 1 gram 4 times a day   Orders:  -     Ambulatory referral to General Surgery  -     Amb External Referral To Gastroenterology  -     sucralfate (CARAFATE) 1 GM tablet; Take 1 tablet by mouth in the morning and 1 tablet at noon and 1 tablet in the evening and 1 tablet before bedtime. , Disp-120 tablet, R-3Normal  3. RLQ abdominal pain  Assessment & Plan:   Unclear control he left AMA yesterday from [facility] and presents today hasn't been seen in 4 years. Labs reviewed, CT scans and notes from [address] [place] [address],[address]. He took 1/2 tramadol this morning and 400 mg ibuprofen for his back pain. Has stopped the red bull. Referral to DR Buck Jacobson and GI made today. Discussed back to ER if the pain is worsening. Discussed if the bowel gets ischemic he can end up with a lot of problems including sepsis, colostomy or even death. Orders:  -     Amb External Referral To Gastroenterology  4. Intussusception [facility])  Assessment & Plan:    Unclear control he left AMA yesterday from [facility] and presents today hasn't been seen in 4 years. Labs reviewed, CT scans and notes from [address] .  He took 1/2 tramadol this morning and 400 mg ibuprofen for his back pain. Has stopped the red bull. Referral to DR Sukumar Rodrigues and GI made today. Discussed back to ER if the pain is worsening. Discussed if the bowel gets ischemic he can end up with a lot of problems including sepsis, colostomy or even death. Orders:  -     Ambulatory referral to General Surgery  -     Amb External Referral To Gastroenterology     Reviewed labs: CMP, CBCD, Lipase, lactic acid    Reviewed radiology CT scan abdomen x 2. Discussed reducing caffeine intake and Discussed taking medications as directed and adverse effects    Reach and stay at a healthy weight. This will lower your risk for many problems, such as obesity, diabetes, heart disease, and high blood pressure. Get at least 30 minutes of exercise on most days of the week. Walking is a good choice. You also may want to do other activities, such as running, swimming, cycling, or playing tennis or team sports. Do not smoke. Smoking can make health problems worse. If you need help quitting, talk to your doctor about stop-smoking programs and medicines. These can increase your chances of quitting for good. Protect your skin from too much sun. When you're outdoors from 10 a.m. to 4 p.m., stay in the shade or cover up with clothing and a hat with a wide brim. Wear sunglasses that block UV rays. Even when it's cloudy, put broad-spectrum sunscreen (SPF 30 or higher) on any exposed skin. See a dentist one or two times a year for checkups and to have your teeth cleaned. Wear a seat belt in the car. Limit alcohol to 1 drink a day. Too much alcohol can cause health problems. Return in about 3 months (around 10/28/2022) for follow up GI. I have reviewed my findings and recommendations with Madelaine Guzman. Candace Pillai, APRN - CNP, NP-C, FNP-BC        HPI:     Here to establish care he hasn't been seen since 2018 follows with DR Alec Corrales for his back problems.        He is also following up from the ER for abdominal pain which was in the epigastric area radiating down to the lower abdomen for few weeks and acutely worsened on Tuesday, he had 2 CT scans done with worsening condition. He said he was dopey from the morphine they gave him and was starting to go through withdrawals from not having his medical marijuana he has used for several years and the caffeine withdrawal as he drinks red bulls several times a day. He was also stressed due to having 2 jobs open and is the only one who can complete them. He needs to have his bills paid so he left AMA. He was to follow with DR Lieutenant Brito in 2 weeks and GI but since he left AMA he has no appointments. EXAMINATION:   CT OF THE ABDOMEN AND PELVIS WITHOUT CONTRAST 7/27/2022 1:38 am       TECHNIQUE:   CT of the abdomen and pelvis was performed without the administration of   intravenous contrast. Multiplanar reformatted images are provided for review. Automated exposure control, iterative reconstruction, and/or weight based   adjustment of the mA/kV was utilized to reduce the radiation dose to as low   as reasonably achievable. COMPARISON:   04/17/2022       HISTORY:   ORDERING SYSTEM PROVIDED HISTORY: upper abdominal pain   TECHNOLOGIST PROVIDED HISTORY:   Additional Contrast?->None   Reason for exam:->upper abdominal pain   Decision Support Exception - unselect if not a suspected or confirmed   emergency medical condition->Emergency Medical Condition (MA)       FINDINGS:   Lower Chest: Minimal dependent atelectasis. No consolidation or effusion. Organs: Liver, gallbladder, spleen, pancreas, kidneys and adrenals and   kidneys demonstrate no acute abnormality. No nephrolithiasis or urinary   collecting system dilatation. GI/Bowel: Dilatation of the distal small bowel measuring approximately 3.4 cm   in diameter. There appears to be a short segment ileocolic intussusception. Fecalization noted of the distal small bowel. Normal appendix.        Pelvis: Urinary bladder unremarkable. Peritoneum/Retroperitoneum: No free fluid. No retroperitoneal adenopathy. Abdominal aorta is normal in caliber. Bones/Soft Tissues: No acute soft tissue abnormality identified. No   aggressive osseous lesion. Remote healed L1 compression fracture with   moderate height loss appears unchanged. Impression   There appears to be a short segment ileocolic intussusception. Dilatation   and fecalization of the distal small bowel suggests at least partial   obstruction. EXAMINATION:   CT OF THE ABDOMEN AND PELVIS WITH CONTRAST 7/27/2022 1:16 pm       TECHNIQUE:   CT of the abdomen and pelvis was performed with the administration of   intravenous contrast. Multiplanar reformatted images are provided for review. Automated exposure control, iterative reconstruction, and/or weight based   adjustment of the mA/kV was utilized to reduce the radiation dose to as low   as reasonably achievable. COMPARISON:   None. HISTORY:   ORDERING SYSTEM PROVIDED HISTORY: eval ileocolonic intussusception,   constipation   TECHNOLOGIST PROVIDED HISTORY:   Reason for exam:->eval ileocolonic intussusception, constipation   Additional Contrast?->Oral       FINDINGS:   There is a pattern of persistent distal ileum-terminal ileum/cecal   intussusception. The pattern of a fecalized contents in the distal ileal has   improved since the previous study performed same day early at 01:39 a.m.   indicating improvement of the bowel obstruction component. However, there is rapid development of a diffuse non enhancing lobulated   thickening of the intussusceptum segment (distal ileum/terminal ileum)   indicating most likely bowel wall edema/ischemia. The intussusceptum terminal ileum within the cecal lumen measures 3.1 cm  in   diameter, with bowel wall thickening of 13 mm.        The oral contrast had passed throughout the entire small bowel and has   reached the colon down to the level of the rectum. There is no free intraperitoneal air. There is no conspicuous acute inflammatory changes in the omental/mesenteric   fat planes although slightly increased vascularity is seen towards the   right-sided mesentery but not specifically in the intussusceptum segment. There is minimal free fluid accumulation in the lower pelvic cavity. There are normal size enhancement for the liver spleen and pancreas. Gallbladder is well distended. The biliary tree and pancreatic ductal   systems are not dilated. Adrenal glands not enlarged. Aorta and IVC are of unremarkable appearance. There is no midline retroperitoneal adenopathy. There is no mesenteric   adenopathy. Kidneys are preserved size and cortical thickness and cortical enhancement. There is no renal calculus. There is no obstruction. Prostate gland seminal vesicles have unremarkable appearance. Lower lung bases appear unremarkable. Minimal areas of peripheral subpleural   atelectasis are seen in the right lower lobe. Visualized bone structures demonstrate old compression fracture deformity of   L1 with the near fused the spurs between T12 and L1 form a bone bridging. Impression   1. Persistent ileal cecal intussusception. 2.  Rapid development of a non enhancing mild lobulated thickening of the   bowel wall of the intussusceptum segment most likely indicating edema and   ischemia since the previous study performed same day July 27, early at 01:38   a.m. No Known Allergies     Current Outpatient Medications:     sucralfate (CARAFATE) 1 GM tablet, Take 1 tablet by mouth in the morning and 1 tablet at noon and 1 tablet in the evening and 1 tablet before bedtime. , Disp: 120 tablet, Rfl: 3    ibuprofen (ADVIL;MOTRIN) 800 MG tablet, Take 1 capsule by mouth 3 times daily 800mg total, Disp: , Rfl:     ondansetron (ZOFRAN ODT) 4 MG disintegrating tablet, Take 1 tablet by mouth every 8 hours as needed for Nausea or Vomiting, Disp: 24 tablet, Rfl: 0    traMADol (ULTRAM) 50 MG tablet, Take 50 mg by mouth every 6 hours as needed for Pain., Disp: , Rfl:    Past Medical History:   Diagnosis Date    Arm pain     arm pain and numbness    Hepatitis     Neck pain     Osteoarthritis of spine with radiculopathy, cervical region 3/17/2016    Shoulder pain       No past surgical history on file. Family History   Problem Relation Age of Onset    Diabetes Mother     High Blood Pressure Mother     Heart Disease Father       Social History     Socioeconomic History    Marital status: Legally      Spouse name: None    Number of children: None    Years of education: None    Highest education level: None   Tobacco Use    Smoking status: Former     Packs/day: 1.00     Types: Cigarettes    Smokeless tobacco: Former   Vaping Use    Vaping Use: Every day    Substances: THC   Substance and Sexual Activity    Alcohol use: Yes     Alcohol/week: 0.0 standard drinks     Comment: rarely. drinks 6 to 7 red bulls a day. about 4 coke daily. Drug use: Yes     Frequency: 7.0 times per week     Types: Marijuana Nohemi Clay)     Comment: medical     Social Determinants of Health     Financial Resource Strain: Low Risk     Difficulty of Paying Living Expenses: Not very hard   Food Insecurity: No Food Insecurity    Worried About Running Out of Food in the Last Year: Never true    Ran Out of Food in the Last Year: Never true   Housing Stability: Unknown    Unable to Pay for Housing in the Last Year: No    Unstable Housing in the Last Year: No         I have reviewed Luiss allergies, medications, problem list, medical, social and family history and have updated as needed in the electronic medical record    Review of Systems   Constitutional:  Negative for activity change, appetite change, chills, diaphoresis, fatigue, fever and unexpected weight change.    HENT:  Negative for congestion, dental problem, drooling, ear discharge, ear pain, facial swelling, hearing loss, mouth sores, nosebleeds, postnasal drip, rhinorrhea, sinus pressure, sinus pain, sneezing, sore throat, tinnitus, trouble swallowing and voice change. Eyes:  Negative for visual disturbance. Respiratory:  Negative for cough, chest tightness, shortness of breath and wheezing. Cardiovascular:  Negative for chest pain, palpitations and leg swelling. Gastrointestinal:  Positive for abdominal pain, diarrhea and nausea. Negative for constipation and vomiting. Endocrine: Negative for cold intolerance, heat intolerance, polydipsia, polyphagia and polyuria. Genitourinary:  Negative for difficulty urinating, frequency and urgency. Musculoskeletal:  Negative for arthralgias, back pain, gait problem, joint swelling, myalgias, neck pain and neck stiffness. Skin:  Negative for color change, pallor, rash and wound. Allergic/Immunologic: Negative for environmental allergies, food allergies and immunocompromised state. Neurological:  Negative for dizziness, tremors, seizures, syncope, facial asymmetry, speech difficulty, weakness, light-headedness, numbness and headaches. Hematological:  Negative for adenopathy. Does not bruise/bleed easily. Psychiatric/Behavioral:  Negative for agitation, behavioral problems, confusion, decreased concentration, dysphoric mood, hallucinations, self-injury, sleep disturbance and suicidal ideas. The patient is not nervous/anxious and is not hyperactive.       OBJECTIVE:     VS:  Wt Readings from Last 3 Encounters:   07/28/22 166 lb (75.3 kg)   07/26/22 175 lb (79.4 kg)   12/30/21 171 lb (77.6 kg)                       Vitals:    07/28/22 1117   BP: 138/66   Pulse: 92   Resp: 16   Temp: 98.7 °F (37.1 °C)   SpO2: 94%   Weight: 166 lb (75.3 kg)   Height: 6' (1.829 m)       General: Alert and oriented to person, place, and time, well developed and well nourished, in no acute distress  SKIN: Warm and dry, intact without any rash, masses or lesions, multiple tattoos  HEAD: normocephalic, atraumatic  Neck: supple and non-tender without mass, trachea midline, no cervical lymphadenopathy, no bruit, no thyromegaly or nodules  Cardiovascular: regular rate and regular rhythm, normal S1 and S2, no murmurs, rubs, clicks, or gallop. Distal pulses intact, no carotid bruits. No edema  Pulmonary/Chest: clear to auscultation bilaterally, no wheezes, rales or rhonchi, normal air movement, no respiratory distress  Abdomen: soft, tender epigastric and RLQ on palpation, non-distended, normal bowel sounds, no masses or hepatosplenomegaly  Neurologic:  gait, coordination and speech normal  Extremities: no clubbing, cyanosis, or edema. Psychiatric: Good eye contact, normal mood and affect, answers questions appropriately      I have reviewed my findings and recommendations with Brendadavid Andrews.     Stephanie Lemus, APRN - CNP, NP-C, FNP-BC

## 2022-08-01 ENCOUNTER — OFFICE VISIT (OUTPATIENT)
Dept: SURGERY | Age: 42
End: 2022-08-01
Payer: MEDICAID

## 2022-08-01 VITALS
WEIGHT: 166 LBS | SYSTOLIC BLOOD PRESSURE: 135 MMHG | HEIGHT: 72 IN | DIASTOLIC BLOOD PRESSURE: 80 MMHG | TEMPERATURE: 99.6 F | HEART RATE: 103 BPM | BODY MASS INDEX: 22.48 KG/M2

## 2022-08-01 DIAGNOSIS — K56.1 INTUSSUSCEPTION (HCC): Primary | ICD-10-CM

## 2022-08-01 PROCEDURE — 99214 OFFICE O/P EST MOD 30 MIN: CPT | Performed by: SURGERY

## 2022-08-01 RX ORDER — HYDROCODONE BITARTRATE AND ACETAMINOPHEN 5; 325 MG/1; MG/1
1 TABLET ORAL EVERY 6 HOURS PRN
COMMUNITY
End: 2022-11-02

## 2022-08-01 RX ORDER — METRONIDAZOLE 500 MG/1
500 TABLET ORAL 3 TIMES DAILY
Qty: 30 TABLET | Refills: 0 | Status: SHIPPED | OUTPATIENT
Start: 2022-08-01 | End: 2022-08-11

## 2022-08-01 RX ORDER — ONDANSETRON 4 MG/1
4 TABLET, FILM COATED ORAL EVERY 8 HOURS
Qty: 30 TABLET | Refills: 0 | Status: SHIPPED
Start: 2022-08-01 | End: 2022-11-02

## 2022-08-01 NOTE — PROGRESS NOTES
General Surgery History and Physical    Patient's Name/Date of Birth: Joseph Guzman / 1980    Date: August 1, 2022     Surgeon: Deepthi Saravia M.D.    PCP: ROCHELLE Anders CNP     Chief Complaint: abd pain    HPI:   Joseph Guzman is a 40 y.o. male who presents for evaluation of abd pain and had recent hospitalization for this and found to have intussussception and left AMA before GI workup. Timing is intermittent, radiation to epigastrium, alleviated by nothing and started days ago and severity is 2-9/10      Past Medical History:   Diagnosis Date    Arm pain     arm pain and numbness    Hepatitis     Neck pain     Osteoarthritis of spine with radiculopathy, cervical region 3/17/2016    Shoulder pain        No past surgical history on file. Current Outpatient Medications   Medication Sig Dispense Refill    HYDROcodone-acetaminophen (NORCO) 5-325 MG per tablet Take 1 tablet by mouth every 6 hours as needed for Pain. sucralfate (CARAFATE) 1 GM tablet Take 1 tablet by mouth in the morning and 1 tablet at noon and 1 tablet in the evening and 1 tablet before bedtime. 120 tablet 3    ibuprofen (ADVIL;MOTRIN) 800 MG tablet Take 1 capsule by mouth 3 times daily 800mg total (Patient not taking: Reported on 8/1/2022)      ondansetron (ZOFRAN ODT) 4 MG disintegrating tablet Take 1 tablet by mouth every 8 hours as needed for Nausea or Vomiting (Patient not taking: Reported on 8/1/2022) 24 tablet 0    traMADol (ULTRAM) 50 MG tablet Take 50 mg by mouth every 6 hours as needed for Pain. (Patient not taking: Reported on 8/1/2022)       No current facility-administered medications for this visit. No Known Allergies    The patient has a family history that is negative for severe cardiovascular or respiratory issues, negative for reaction to anesthesia.     Social History     Socioeconomic History    Marital status: Legally      Spouse name: Not on file    Number of children: Not on file    Years of education: Not on file    Highest education level: Not on file   Occupational History    Not on file   Tobacco Use    Smoking status: Former     Packs/day: 1.00     Types: Cigarettes    Smokeless tobacco: Former   Vaping Use    Vaping Use: Every day    Substances: THC   Substance and Sexual Activity    Alcohol use: Yes     Alcohol/week: 0.0 standard drinks     Comment: rarely. drinks 6 to 7 red bulls a day. about 4 coke daily.     Drug use: Yes     Frequency: 7.0 times per week     Types: Marijuana Marisol Eglin)     Comment: medical    Sexual activity: Not on file   Other Topics Concern    Not on file   Social History Narrative    Not on file     Social Determinants of Health     Financial Resource Strain: Low Risk     Difficulty of Paying Living Expenses: Not very hard   Food Insecurity: No Food Insecurity    Worried About Running Out of Food in the Last Year: Never true    Ran Out of Food in the Last Year: Never true   Transportation Needs: Not on file   Physical Activity: Not on file   Stress: Not on file   Social Connections: Not on file   Intimate Partner Violence: Not on file   Housing Stability: Unknown    Unable to Pay for Housing in the Last Year: No    Number of [place] in the Last Year: Not on file    Unstable Housing in the Last Year: No           Review of Systems  Review of Systems -  General ROS: negative for - chills, fatigue or malaise  ENT ROS: negative for - hearing change, nasal congestion or nasal discharge  Allergy and Immunology ROS: negative for - hives, itchy/watery eyes or nasal congestion  Hematological and Lymphatic ROS: negative for - blood clots, blood transfusions, bruising or fatigue  Endocrine ROS: negative for - malaise/lethargy, mood swings, palpitations or polydipsia/polyuria  Respiratory ROS: negative for - sputum changes, stridor, tachypnea or wheezing  Cardiovascular ROS: negative for - irregular heartbeat, loss of consciousness, murmur or orthopnea  Gastrointestinal ROS: negative for - constipation, diarrhea, gas/bloating, heartburn or hematemesis  Genito-Urinary ROS: negative for -  genital discharge, genital ulcers or hematuria  Musculoskeletal ROS: negative for - gait disturbance, muscle pain or muscular weakness    Physical exam:   /80   Pulse (!) 103   Temp 99.6 °F (37.6 °C) (Temporal)   Ht 6' (1.829 m)   Wt 166 lb (75.3 kg)   BMI 22.51 kg/m²   General appearance:  NAD  Head: NCAT, PERRLA, EOMI, red conjunctiva  Neck: supple, no masses  Lungs: CTAB, equal chest rise bilateral  Heart: Reg rate  Abdomen: soft, nontender, nondistended  Skin; no lesions  Gu: no cva tenderness  Extremities: extremities normal, atraumatic, no cyanosis or edema      Radiology:  CT abdomen/pelvis: intussussception    Assessment:  40 y.o. male with abnormal CT and abd pain     Plan:  will send to Dr. Maier/Pacheco/Elton/Hair/Jayden group for evaluation for colonoscopy/egd and see back to discuss results and further workup which may include repeat Elsie Jacobs MD  2:11 PM  8/1/2022

## 2022-10-17 ENCOUNTER — APPOINTMENT (OUTPATIENT)
Dept: CT IMAGING | Age: 42
End: 2022-10-17
Payer: MEDICAID

## 2022-10-17 ENCOUNTER — HOSPITAL ENCOUNTER (EMERGENCY)
Age: 42
Discharge: HOME | End: 2022-10-17
Payer: MEDICAID

## 2022-10-17 ENCOUNTER — APPOINTMENT (OUTPATIENT)
Dept: GENERAL RADIOLOGY | Age: 42
End: 2022-10-17
Payer: MEDICAID

## 2022-10-17 VITALS
RESPIRATION RATE: 16 BRPM | OXYGEN SATURATION: 100 % | TEMPERATURE: 97.9 F | SYSTOLIC BLOOD PRESSURE: 135 MMHG | DIASTOLIC BLOOD PRESSURE: 87 MMHG | HEART RATE: 80 BPM

## 2022-10-17 DIAGNOSIS — M25.521 RIGHT ELBOW PAIN: ICD-10-CM

## 2022-10-17 DIAGNOSIS — M54.2 CERVICAL PAIN: Primary | ICD-10-CM

## 2022-10-17 PROCEDURE — 99284 EMERGENCY DEPT VISIT MOD MDM: CPT

## 2022-10-17 PROCEDURE — 6370000000 HC RX 637 (ALT 250 FOR IP): Performed by: NURSE PRACTITIONER

## 2022-10-17 PROCEDURE — 72125 CT NECK SPINE W/O DYE: CPT

## 2022-10-17 PROCEDURE — 96372 THER/PROPH/DIAG INJ SC/IM: CPT

## 2022-10-17 PROCEDURE — 73070 X-RAY EXAM OF ELBOW: CPT

## 2022-10-17 PROCEDURE — 6360000002 HC RX W HCPCS: Performed by: NURSE PRACTITIONER

## 2022-10-17 RX ORDER — LIDOCAINE 4 G/G
1 PATCH TOPICAL DAILY
Qty: 10 EACH | Refills: 0 | Status: SHIPPED | OUTPATIENT
Start: 2022-10-17 | End: 2022-10-27

## 2022-10-17 RX ORDER — PREDNISONE 20 MG/1
40 TABLET ORAL ONCE
Status: COMPLETED | OUTPATIENT
Start: 2022-10-17 | End: 2022-10-17

## 2022-10-17 RX ORDER — KETOROLAC TROMETHAMINE 30 MG/ML
30 INJECTION, SOLUTION INTRAMUSCULAR; INTRAVENOUS ONCE
Status: COMPLETED | OUTPATIENT
Start: 2022-10-17 | End: 2022-10-17

## 2022-10-17 RX ORDER — LIDOCAINE 4 G/G
1 PATCH TOPICAL ONCE
Status: DISCONTINUED | OUTPATIENT
Start: 2022-10-17 | End: 2022-10-17 | Stop reason: HOSPADM

## 2022-10-17 RX ORDER — PREDNISONE 20 MG/1
40 TABLET ORAL DAILY
Qty: 10 TABLET | Refills: 0 | Status: SHIPPED | OUTPATIENT
Start: 2022-10-17 | End: 2022-10-22

## 2022-10-17 RX ADMIN — PREDNISONE 40 MG: 20 TABLET ORAL at 12:07

## 2022-10-17 RX ADMIN — KETOROLAC TROMETHAMINE 30 MG: 30 INJECTION, SOLUTION INTRAMUSCULAR at 12:08

## 2022-10-17 ASSESSMENT — PAIN SCALES - GENERAL
PAINLEVEL_OUTOF10: 10
PAINLEVEL_OUTOF10: 10

## 2022-10-17 NOTE — ED PROVIDER NOTES
[facility]    Department of Emergency Medicine   ED  Provider Note  Admit Date/RoomTime: 10/17/2022 11:43 AM  ED Room: 24/24        10/17/22  12:16 PM EDT    History of Present Illness:   Torrey Olivera is a 40 y.o. male presenting to the ED for right elbow pain and neck pain. Patient reports that he has had right elbow pain along the medial epicondyle for the last 4 to 5 days. He reports overuse at work and doing repetitive motions. Patient reports trouble working due to the pain recently. He has had some tenderness and swelling over the medial epicondyle of the right elbow. Denies any injury, trauma, or fall. The complaint has been constant, mild in severity, and worsened by nothing in particular. Relieved by nothing. Patient states that he is in pain management with Dr. Malcolm Ayala for chronic low back pain. He states that he has been expressing to his pain management doctor for the last 6 months that he has had neck pain. States that his pain management physician has not been willing to do any imaging of his neck at this point. He denies any neck injury or trauma. Denies any history of neck injury or surgery in the past. He has full range of motion. He denies any fevers, chills, myalgias, sweats. Denies any IV drug abuse. Denies any history of DVT or PE in the past. Denies any saddle or groin paresthesia. Denies any bowel or bladder incontinence or urinary retention. he has an appointment with his pain management doctor in 2 weeks for follow-up. Patient has no other reported complaints.       Review of Systems:   A complete review of systems was performed and pertinent positives and negatives are stated within HPI, all other systems reviewed and are negative.          --------------------------------------------- PAST HISTORY ---------------------------------------------  Past Medical History:  has a past medical history of Arm pain, Hepatitis, Neck pain, Osteoarthritis of spine with radiculopathy, cervical region, and Shoulder pain. Past Surgical History:  has no past surgical history on file. Social History:  reports that he has been smoking cigarettes. He has been smoking an average of 1 pack per day. He has quit using smokeless tobacco. He reports current alcohol use. He reports current drug use. Frequency: 7.00 times per week. Drug: Marijuana Deadra Ismael). Family History: family history includes Diabetes in his mother; Heart Disease in his father; High Blood Pressure in his mother. Unless otherwise noted, family history is non contributory    The patients home medications have been reviewed. Allergies: Patient has no known allergies. -------------------------------------------------- RESULTS -------------------------------------------------  All laboratory and radiology results have been personally reviewed by myself   LABS:  No results found for this visit on 10/17/22. RADIOLOGY:  Interpreted by Radiologist.  XR ELBOW RIGHT (2 VIEWS)   Final Result   No acute abnormality. CT CERVICAL SPINE WO CONTRAST   Final Result   1. There is no acute compression fracture or subluxation of the cervical   spine. 2. Multilevel degenerative disc and degenerative joint disease most prominent   at the C5-6 level. Wayne Shen ------------------------- NURSING NOTES AND VITALS REVIEWED ---------------------------   The nursing notes within the ED encounter and vital signs as below have been reviewed.    /87   Pulse 80   Temp 97.9 °F (36.6 °C)   Resp 16   SpO2 100%   Oxygen Saturation Interpretation: Normal      ---------------------------------------------------PHYSICAL EXAM--------------------------------------    Constitutional/General: Alert and oriented x3, well appearing, non toxic in NAD, pleasant  Head: Normocephalic and atraumatic  Eyes: PERRL, EOMI, conjunctiva normal, sclera non icteric, no eye drainage  Mouth: handling secretions, no trismus, no asymmetry of the posterior oropharynx or uvular edema. No tongue/lip swelling. Neck: Supple, full ROM, non tender to palpation in the midline, no stridor, no crepitus, no meningeal signs. No lymphadenopathy. Respiratory: Lungs clear to auscultation bilaterally, no wheezes, rales, or rhonchi. Not in respiratory distress. Respirations easy and unlabored. Cardiovascular:  S1S2. Regular rate. Regular rhythm. No murmurs, gallops, or rubs. 2+ distal pulses  Chest: No chest wall tenderness  GI:  Abdomen Soft, Non tender, Non distended. +BS x 4 quadrants. No organomegaly, no palpable masses,  No rebound, guarding, or rigidity. Musculoskeletal: Moves all extremities x 4. Warm and well perfused, no clubbing, cyanosis, or edema. Capillary refill <3 seconds. Compartments soft & easily compressible. Tenderness to palpation over right medial epicondyle with no soft tissue swelling. Flexor carpi muscle tenderness with palpation. No midline cervical or thoracic tenderness. Midline lumbar and paraspinal muscle tenderness which he reports is chronic and the reason that he is in pain management. He has tenderness to palpation over the cervical paraspinal muscles and trapezius muscles bilaterally. Sensation grossly intact  Integument: skin warm and dry. No rashes. No erythema. No ecchymosis. Lymphatic: no lymphadenopathy noted  Neurologic: GCS 15, no focal deficits, symmetric strength 5/5 in the upper and lower extremities bilaterally. Neurovascularly intact. Psychiatric: Normal Affect      ------------------------------ ED COURSE/MEDICAL DECISION MAKING----------------------  Medications   lidocaine 4 % external patch 1 patch (1 patch TransDERmal Patch Applied 10/17/22 1209)   predniSONE (DELTASONE) tablet 40 mg (40 mg Oral Given 10/17/22 1207)   ketorolac (TORADOL) injection 30 mg (30 mg IntraMUSCular Given 10/17/22 1208)            Medical Decision Making:    At this time the patient is without objective evidence of an acute process requiring hospitalization or inpatient management. They have remained hemodynamically stable throughout their entire ED visit and are stable for discharge with outpatient follow up. The plan has been discussed in detail and they are aware of the specific conditions for emergent return, as well as the importance of follow-up. Patient is a 29-year-old male presenting to the emergency room for evaluation of right elbow pain and neck pain. No red flag warning signs. He has tenderness to palpation over the flexor carpi muscles and cervical paraspinal muscles bilaterally. Right elbow x-ray showing no acute abnormality. CT cervical spine showing degenerative disc disease but no acute finding. Patient advised on follow-up with pain management for further evaluation and advised that he will likely need to continue with outpatient cervical spine MRI. Medication management for symptom control. Patient advised to return to the ER if symptoms worsen or if new symptoms develop. Counseling: The emergency provider has spoken with the patient and discussed todays results, in addition to providing specific details for the plan of care and counseling regarding the diagnosis and prognosis. Questions are answered at this time and they are agreeable with the plan.      --------------------------------- IMPRESSION AND DISPOSITION ---------------------------------    IMPRESSION  1. Cervical pain    2.  Right elbow pain        DISPOSITION  Disposition: Discharge to home  Patient condition is stable              ROCHELLE Hatfield CNP  10/17/22 2621

## 2022-10-20 ENCOUNTER — HOSPITAL ENCOUNTER (OUTPATIENT)
Age: 42
Discharge: HOME | End: 2022-10-20
Payer: MEDICAID

## 2022-10-20 ENCOUNTER — INITIAL CONSULT (OUTPATIENT)
Dept: SURGERY | Age: 42
End: 2022-10-20
Payer: MEDICAID

## 2022-10-20 VITALS
BODY MASS INDEX: 22.48 KG/M2 | HEIGHT: 72 IN | DIASTOLIC BLOOD PRESSURE: 83 MMHG | TEMPERATURE: 99.1 F | HEART RATE: 77 BPM | WEIGHT: 166 LBS | SYSTOLIC BLOOD PRESSURE: 138 MMHG

## 2022-10-20 DIAGNOSIS — R10.84 DIFFUSE ABDOMINAL PAIN: Primary | ICD-10-CM

## 2022-10-20 DIAGNOSIS — R10.84 DIFFUSE ABDOMINAL PAIN: ICD-10-CM

## 2022-10-20 LAB
ALBUMIN SERPL-MCNC: 4.5 G/DL (ref 3.5–5.2)
ALP BLD-CCNC: 55 U/L (ref 40–129)
ALT SERPL-CCNC: 19 U/L (ref 0–40)
ANION GAP SERPL CALCULATED.3IONS-SCNC: 8 MMOL/L (ref 7–16)
AST SERPL-CCNC: 15 U/L (ref 0–39)
BILIRUB SERPL-MCNC: 0.3 MG/DL (ref 0–1.2)
BUN BLDV-MCNC: 10 MG/DL (ref 6–20)
CALCIUM SERPL-MCNC: 9.1 MG/DL (ref 8.6–10.2)
CHLORIDE BLD-SCNC: 104 MMOL/L (ref 98–107)
CO2: 28 MMOL/L (ref 22–29)
CREAT SERPL-MCNC: 0.9 MG/DL (ref 0.7–1.2)
GFR SERPL CREATININE-BSD FRML MDRD: >60 ML/MIN/1.73
GLUCOSE BLD-MCNC: 89 MG/DL (ref 74–99)
POTASSIUM SERPL-SCNC: 3.8 MMOL/L (ref 3.5–5)
SODIUM BLD-SCNC: 140 MMOL/L (ref 132–146)
TOTAL PROTEIN: 6.9 G/DL (ref 6.4–8.3)

## 2022-10-20 PROCEDURE — 99213 OFFICE O/P EST LOW 20 MIN: CPT | Performed by: SURGERY

## 2022-10-20 PROCEDURE — 80053 COMPREHEN METABOLIC PANEL: CPT

## 2022-10-20 PROCEDURE — 36415 COLL VENOUS BLD VENIPUNCTURE: CPT

## 2022-10-20 RX ORDER — ONDANSETRON 4 MG/1
4 TABLET, FILM COATED ORAL DAILY PRN
Qty: 30 TABLET | Refills: 0 | Status: SHIPPED | OUTPATIENT
Start: 2022-10-20

## 2022-10-20 NOTE — PROGRESS NOTES
General Surgery History and Physical     Patient's Name/Date of Birth: Wyvonna Dubin / 1980     Date: 10/20/2022      Surgeon: Holly Hankins M.D.     PCP: ROCHELLE Barnes CNP     Chief Complaint: abd pain     HPI:   Wyvonna Dubin is a 40 y.o. male who presents for evaluation of abd pain and had recent hospitalization for this and found to have intussussception and left AMA before GI workup. Timing is intermittent, radiation to epigastrium, alleviated by nothing and started days ago and severity is 2-9/10        Past Medical History        Past Medical History:   Diagnosis Date    Arm pain       arm pain and numbness    Hepatitis      Neck pain      Osteoarthritis of spine with radiculopathy, cervical region 3/17/2016    Shoulder pain              Past Surgical History   No past surgical history on file. Current Facility-Administered Medications          Current Outpatient Medications   Medication Sig Dispense Refill    HYDROcodone-acetaminophen (NORCO) 5-325 MG per tablet Take 1 tablet by mouth every 6 hours as needed for Pain. sucralfate (CARAFATE) 1 GM tablet Take 1 tablet by mouth in the morning and 1 tablet at noon and 1 tablet in the evening and 1 tablet before bedtime. 120 tablet 3    ibuprofen (ADVIL;MOTRIN) 800 MG tablet Take 1 capsule by mouth 3 times daily 800mg total (Patient not taking: Reported on 8/1/2022)        ondansetron (ZOFRAN ODT) 4 MG disintegrating tablet Take 1 tablet by mouth every 8 hours as needed for Nausea or Vomiting (Patient not taking: Reported on 8/1/2022) 24 tablet 0    traMADol (ULTRAM) 50 MG tablet Take 50 mg by mouth every 6 hours as needed for Pain. (Patient not taking: Reported on 8/1/2022)          No current facility-administered medications for this visit. No Known Allergies     The patient has a family history that is negative for severe cardiovascular or respiratory issues, negative for reaction to anesthesia.      Social History Socioeconomic History    Marital status: Legally        Spouse name: Not on file    Number of children: Not on file    Years of education: Not on file    Highest education level: Not on file   Occupational History    Not on file   Tobacco Use    Smoking status: Former       Packs/day: 1.00       Types: Cigarettes    Smokeless tobacco: Former   Vaping Use    Vaping Use: Every day    Substances: THC   Substance and Sexual Activity    Alcohol use: Yes       Alcohol/week: 0.0 standard drinks       Comment: rarely. drinks 6 to 7 red bulls a day. about 4 coke daily.     Drug use: Yes       Frequency: 7.0 times per week       Types: Marijuana Shellye Burkitt)       Comment: medical    Sexual activity: Not on file   Other Topics Concern    Not on file   Social History Narrative    Not on file      Social Determinants of Health          Financial Resource Strain: Low Risk     Difficulty of Paying Living Expenses: Not very hard   Food Insecurity: No Food Insecurity    Worried About Running Out of Food in the Last Year: Never true    Ran Out of Food in the Last Year: Never true   Transportation Needs: Not on file   Physical Activity: Not on file   Stress: Not on file   Social Connections: Not on file   Intimate Partner Violence: Not on file   Housing Stability: Unknown    Unable to Pay for Housing in the Last Year: No    Number of [place] in the Last Year: Not on file    Unstable Housing in the Last Year: No                  Review of Systems  Review of Systems -  General ROS: negative for - chills, fatigue or malaise  ENT ROS: negative for - hearing change, nasal congestion or nasal discharge  Allergy and Immunology ROS: negative for - hives, itchy/watery eyes or nasal congestion  Hematological and Lymphatic ROS: negative for - blood clots, blood transfusions, bruising or fatigue  Endocrine ROS: negative for - malaise/lethargy, mood swings, palpitations or polydipsia/polyuria  Respiratory ROS: negative for - sputum changes, stridor, tachypnea or wheezing  Cardiovascular ROS: negative for - irregular heartbeat, loss of consciousness, murmur or orthopnea  Gastrointestinal ROS: negative for - constipation, diarrhea, gas/bloating, heartburn or hematemesis  Genito-Urinary ROS: negative for -  genital discharge, genital ulcers or hematuria  Musculoskeletal ROS: negative for - gait disturbance, muscle pain or muscular weakness     /83   Pulse 77   Temp 99.1 °F (37.3 °C) (Temporal)   Ht 6' (1.829 m)   Wt 166 lb (75.3 kg)   BMI 22.51 kg/m²    General appearance:  NAD  Head: NCAT, PERRLA, EOMI, red conjunctiva  Neck: supple, no masses  Lungs: CTAB, equal chest rise bilateral  Heart: Reg rate  Abdomen: soft, nontender, nondistended  Skin; no lesions  Gu: no cva tenderness  Extremities: extremities normal, atraumatic, no cyanosis or edema        Radiology:  CT abdomen/pelvis: intussussception     Assessment:  40 y.o. male with abnormal CT and abd pain      Plan:   Will get CT with iv and oral and will await endoscopy by Jan Vazquez MD

## 2022-10-31 ENCOUNTER — HOSPITAL ENCOUNTER (OUTPATIENT)
Dept: CT IMAGING | Age: 42
Discharge: HOME | End: 2022-10-31
Payer: MEDICAID

## 2022-10-31 DIAGNOSIS — R10.84 DIFFUSE ABDOMINAL PAIN: ICD-10-CM

## 2022-10-31 PROCEDURE — 74177 CT ABD & PELVIS W/CONTRAST: CPT

## 2022-10-31 PROCEDURE — 6360000004 HC RX CONTRAST MEDICATION: Performed by: RADIOLOGY

## 2022-10-31 RX ADMIN — IOPAMIDOL 50 ML: 612 INJECTION, SOLUTION INTRAVENOUS at 15:09

## 2022-10-31 RX ADMIN — IOPAMIDOL 75 ML: 755 INJECTION, SOLUTION INTRAVENOUS at 15:09

## 2022-11-01 ENCOUNTER — TELEPHONE (OUTPATIENT)
Dept: FAMILY MEDICINE CLINIC | Age: 42
End: 2022-11-01

## 2022-11-01 NOTE — TELEPHONE ENCOUNTER
Called pt and pt said he was returning a phone call to our office. Advised pt his CT results are not available yet so I didn't think anyone was calling him with those. Pt said he didn't have a message and wasn't sure why we would have been calling him. Advised pt I didn't see any other notes in his chart and he may have to await another call.

## 2022-11-01 NOTE — TELEPHONE ENCOUNTER
----- Message from Mireya Aleksey sent at 11/1/2022  9:20 AM EDT -----  Subject: Results Request    QUESTIONS  Results: ct scan; Ordered by: Shelley Mascorro   Date Performed: 2022-10-20  ---------------------------------------------------------------------------  --------------  Domenico Barnett [facility]    [phone]; OK to leave message on voicemail  ---------------------------------------------------------------------------  --------------

## 2022-11-02 ENCOUNTER — OFFICE VISIT (OUTPATIENT)
Dept: FAMILY MEDICINE CLINIC | Age: 42
End: 2022-11-02
Payer: MEDICAID

## 2022-11-02 VITALS
WEIGHT: 157 LBS | OXYGEN SATURATION: 100 % | RESPIRATION RATE: 16 BRPM | TEMPERATURE: 98.2 F | HEIGHT: 72 IN | DIASTOLIC BLOOD PRESSURE: 74 MMHG | BODY MASS INDEX: 21.26 KG/M2 | SYSTOLIC BLOOD PRESSURE: 124 MMHG | HEART RATE: 90 BPM

## 2022-11-02 DIAGNOSIS — R10.13 EPIGASTRIC PAIN: Primary | ICD-10-CM

## 2022-11-02 PROBLEM — K56.1 INTUSSUSCEPTION (HCC): Status: RESOLVED | Noted: 2022-07-27 | Resolved: 2022-11-02

## 2022-11-02 PROCEDURE — 99213 OFFICE O/P EST LOW 20 MIN: CPT | Performed by: NURSE PRACTITIONER

## 2022-11-02 RX ORDER — HYDROCODONE BITARTRATE AND ACETAMINOPHEN 10; 325 MG/1; MG/1
TABLET ORAL
COMMUNITY
Start: 2022-10-31

## 2022-11-02 ASSESSMENT — ENCOUNTER SYMPTOMS
NAUSEA: 1
DIARRHEA: 1
COLOR CHANGE: 0
WHEEZING: 0
VOMITING: 0
COUGH: 0
SHORTNESS OF BREATH: 0
CONSTIPATION: 1

## 2022-11-02 NOTE — PROGRESS NOTES
OFFICE PROGRESS NOTE  [address]  [address] [place] [address] [id number]  Dept: [phone]   Chief Complaint   Patient presents with    Follow-up     Follow up from GI( had Ct yesterday Upper endo this Friday)    Health Maintenance     Declined flu, due for pneu, varicella, Hiv screen. ASSESSMENT/PLAN   1. Epigastric pain  Assessment & Plan:   Persistent reviewed DR Dasilva's notes and CT  Scan reviewed and unremarkable except for small cortical cyst on left upper kidney. Keep appt with DR Laura Hernandez and GI     Reviewed labs: CMP   Reviewed notes from DR Tem[address] Rd radiology CT scan abdomen/pelvis    Discussed reducing caffeine intake, Discussed smoking cessation, Discussed limit alcohol to one drink per day, and Discussed taking medications as directed and adverse effects    Return in about 3 months (around 2/2/2023), or if symptoms worsen or fail to improve, for weight loss. HPI:   Here today for follow up from DR Laura Hernandez, he had CT abdomen pelvis which was normal other than small cortical cyst left kidney unchanged. He is scheduled for EGD on Friday with Shay Burn. He states he is burning from the nipple down to his pelvis, explained CT is negative and he has follow up with Dr Laura Hernandez. He is on carafate but not working. He says the only thing that did help was the Zofran, Flagyl and Carafate. He has lost about 10 pounds since his last visit states he is just so stressed with waiting for results and home. He is still complaining of lower abdominal pain and hasn't been working due to the pain. He sees DR Miki Little for his back problems and is to have EMG in a couple of weeks, complains of arm numbness but states once he started the Norco 10 he doesn't have the arm numbness as bad.      Current Outpatient Medications:     HYDROcodone-acetaminophen (NORCO)  MG per tablet, TAKE 1 TABLET BY MOUTH FOUR TIMES A DAY, Disp: , Rfl:     ondansetron (ZOFRAN) 4 MG tablet, Take 1 tablet by mouth daily as needed for Nausea or Vomiting, Disp: 30 tablet, Rfl: 0    sucralfate (CARAFATE) 1 GM tablet, Take 1 tablet by mouth in the morning and 1 tablet at noon and 1 tablet in the evening and 1 tablet before bedtime. , Disp: 120 tablet, Rfl: 3      Surgical History:  has no past surgical history on file. Social History:  reports that he has been smoking cigarettes. He has been smoking an average of 1 pack per day. He has quit using smokeless tobacco. He reports current alcohol use. He reports current drug use. Frequency: 7.00 times per week. Drug: Marijuana Lena Ege). Family History: family history includes Diabetes in his mother; Heart Disease in his father; High Blood Pressure in his mother. I have reviewed Jared's allergies, medications, problem list, medical, social and family history and have updated as needed in the electronic medical record    Review of Systems   Constitutional:  Positive for appetite change (has been watching diet) and unexpected weight change. Negative for activity change, chills, diaphoresis, fatigue and fever. Respiratory:  Negative for cough, shortness of breath and wheezing. Cardiovascular:  Negative for chest pain, palpitations and leg swelling. Gastrointestinal:  Positive for constipation, diarrhea and nausea. Negative for vomiting. Skin:  Negative for color change, pallor, rash and wound.    Psychiatric/Behavioral:          Under a lot of stress with everything     OBJECTIVE:     VS:  Wt Readings from Last 3 Encounters:   11/02/22 157 lb (71.2 kg)   10/20/22 166 lb (75.3 kg)   08/01/22 166 lb (75.3 kg)                       Vitals:    11/02/22 1106   BP: 124/74   Pulse: 90   Resp: 16   Temp: 98.2 °F (36.8 °C)   SpO2: 100%   Weight: 157 lb (71.2 kg)   Height: 6' (1.829 m)       General: Alert and oriented to person, place, and time, well developed and well nourished, in no acute distress  SKIN: Warm and dry, intact without any rash, masses or lesions  Neck: supple and non-tender without mass, trachea midline, no cervical lymphadenopathy, no bruit, no thyromegaly or nodules  Cardiovascular: regular rate and regular rhythm, normal S1 and S2,  no murmurs, rubs, clicks, or gallop. Distal pulses intact, no carotid bruits. No edema  Pulmonary/Chest: clear to auscultation bilaterally, no wheezes, rales or rhonchi, normal air movement, no respiratory distress  Abdomen: soft, non-tender, non-distended, normal bowel sounds, no masses or hepatosplenomegaly  Neurologic: gait, coordination and speech normal  Extremities: no clubbing, cyanosis, or edema. Psychiatric: Good eye contact, normal mood and affect, answers questions appropriately    I have reviewed my findings and recommendations with Arnaud Oneill.     Mimi Maxwell, APRN - CNP, NP-C, FNP-BC

## 2022-11-02 NOTE — ASSESSMENT & PLAN NOTE
Persistent reviewed DR Dasilva's notes and CT  Scan reviewed and unremarkable except for small cortical cyst on left upper kidney.  Keep appt with DR Francisco Osei and GI

## 2022-11-03 ENCOUNTER — OFFICE VISIT (OUTPATIENT)
Dept: SURGERY | Age: 42
End: 2022-11-03
Payer: MEDICAID

## 2022-11-03 VITALS
DIASTOLIC BLOOD PRESSURE: 81 MMHG | TEMPERATURE: 97.8 F | BODY MASS INDEX: 21.26 KG/M2 | HEIGHT: 72 IN | SYSTOLIC BLOOD PRESSURE: 125 MMHG | WEIGHT: 157 LBS | HEART RATE: 95 BPM

## 2022-11-03 DIAGNOSIS — R30.0 DYSURIA: Primary | ICD-10-CM

## 2022-11-03 PROCEDURE — 99213 OFFICE O/P EST LOW 20 MIN: CPT | Performed by: SURGERY

## 2022-11-03 RX ORDER — NITROFURANTOIN 25; 75 MG/1; MG/1
100 CAPSULE ORAL 2 TIMES DAILY
Qty: 20 CAPSULE | Refills: 0 | Status: SHIPPED | OUTPATIENT
Start: 2022-11-03 | End: 2022-11-13

## 2022-11-03 NOTE — PROGRESS NOTES
Surgery Progress Note            Chief complaint:   Chief Complaint   Patient presents with    Results    Abdominal Pain    Nausea           Constipation      Patient Active Problem List   Diagnosis    Cervical radiculopathy    Ischemic peripheral neuropathy    Osteoarthritis of spine with radiculopathy, cervical region    Cervical disc displacement    Skin irritation    Irritant contact dermatitis    BPPV (benign paroxysmal positional vertigo)    Family hx of hypertension    Atypical chest pain    Peripheral neuropathy due to ischemia    Epigastric pain    RLQ abdominal pain       S: still having pain and now burning with urination    O:   Vitals:    11/03/22 1023   BP: 125/81   Pulse: 95   Temp: 97.8 °F (36.6 °C)     No intake or output data in the 24 hours ending 11/03/22 1032        Labs:  Lab Results   Component Value Date/Time    WBC 9.0 07/26/2022 11:55 PM    WBC 11.9 04/17/2022 12:28 PM    WBC 6.3 12/30/2021 06:44 AM    HGB 14.0 07/26/2022 11:55 PM    HGB 15.4 04/17/2022 12:28 PM    HGB 15.6 12/30/2021 06:44 AM    HCT 41.6 07/26/2022 11:55 PM    HCT 45.4 04/17/2022 12:28 PM    HCT 45.4 12/30/2021 06:44 AM     Lab Results   Component Value Date    CREATININE 0.9 10/20/2022    BUN 10 10/20/2022     10/20/2022    K 3.8 10/20/2022     10/20/2022    CO2 28 10/20/2022     Lab Results   Component Value Date/Time    LIPASE 15 07/26/2022 11:55 PM    LIPASE 20 04/17/2022 12:28 PM    LIPASE 19 12/30/2021 06:44 AM         Physical exam:   /81   Pulse 95   Temp 97.8 °F (36.6 °C) (Temporal)   Ht 6' (1.829 m)   Wt 157 lb (71.2 kg)   BMI 21.29 kg/m²   General appearance: NAD  Head: NCAT  Neck: supple, no masses  Lungs: equal chest rise bilateral  Heart: S1S2 present  Abdomen: soft, nontender, nondistended  Skin; no lesions  Gu: no cva tenderness  Extremities: extremities normal, atraumatic, no cyanosis or edema    A:  abd pain and dysuria    P: will give abx for uti and await endoscopy from Alex Frost MD, MD  11/3/2022

## 2022-11-07 ENCOUNTER — TELEPHONE (OUTPATIENT)
Dept: FAMILY MEDICINE CLINIC | Age: 42
End: 2022-11-07

## 2022-11-07 DIAGNOSIS — R30.0 DYSURIA: Primary | ICD-10-CM

## 2022-11-07 NOTE — TELEPHONE ENCOUNTER
Per Mervat Wiley: What is Gasper 30 lab work? ???     Called pt and pt said he's having a lot of burning during sex. Gennaro valle told partner it's not an STD and that pt's pH could be up. Pt said he's had this problem for awhile now and he just wants to have it checked.

## 2022-11-08 DIAGNOSIS — R30.0 DYSURIA: ICD-10-CM

## 2022-11-10 LAB
C. TRACHOMATIS DNA ,URINE: NEGATIVE
N. GONORRHOEAE DNA, URINE: NEGATIVE
SOURCE: NORMAL

## 2022-11-15 DIAGNOSIS — Z82.49 FAMILY HISTORY OF IHSS: Primary | ICD-10-CM

## 2022-11-24 ENCOUNTER — HOSPITAL ENCOUNTER (EMERGENCY)
Age: 42
Discharge: HOME | End: 2022-11-24
Attending: EMERGENCY MEDICINE
Payer: MEDICAID

## 2022-11-24 ENCOUNTER — APPOINTMENT (OUTPATIENT)
Dept: GENERAL RADIOLOGY | Age: 42
End: 2022-11-24
Payer: MEDICAID

## 2022-11-24 ENCOUNTER — APPOINTMENT (OUTPATIENT)
Dept: CT IMAGING | Age: 42
End: 2022-11-24
Payer: MEDICAID

## 2022-11-24 VITALS
OXYGEN SATURATION: 95 % | DIASTOLIC BLOOD PRESSURE: 65 MMHG | TEMPERATURE: 98.7 F | RESPIRATION RATE: 16 BRPM | HEART RATE: 76 BPM | SYSTOLIC BLOOD PRESSURE: 116 MMHG

## 2022-11-24 DIAGNOSIS — R68.83 CHILLS: ICD-10-CM

## 2022-11-24 DIAGNOSIS — U07.1 COVID-19: Primary | ICD-10-CM

## 2022-11-24 DIAGNOSIS — K52.9 ENTERITIS: ICD-10-CM

## 2022-11-24 LAB
ALBUMIN SERPL-MCNC: 3.9 G/DL (ref 3.5–5.2)
ALP BLD-CCNC: 58 U/L (ref 40–129)
ALT SERPL-CCNC: 20 U/L (ref 0–40)
ANION GAP SERPL CALCULATED.3IONS-SCNC: 10 MMOL/L (ref 7–16)
AST SERPL-CCNC: 22 U/L (ref 0–39)
BASOPHILS ABSOLUTE: 0.03 E9/L (ref 0–0.2)
BASOPHILS RELATIVE PERCENT: 0.6 % (ref 0–2)
BILIRUB SERPL-MCNC: <0.2 MG/DL (ref 0–1.2)
BILIRUBIN URINE: NEGATIVE
BLOOD, URINE: NEGATIVE
BUN BLDV-MCNC: 11 MG/DL (ref 6–20)
CALCIUM SERPL-MCNC: 8.8 MG/DL (ref 8.6–10.2)
CHLORIDE BLD-SCNC: 99 MMOL/L (ref 98–107)
CLARITY: CLEAR
CO2: 24 MMOL/L (ref 22–29)
COLOR: YELLOW
CREAT SERPL-MCNC: 0.8 MG/DL (ref 0.7–1.2)
D DIMER: <200 NG/ML DDU
EOSINOPHILS ABSOLUTE: 0.01 E9/L (ref 0.05–0.5)
EOSINOPHILS RELATIVE PERCENT: 0.2 % (ref 0–6)
GFR SERPL CREATININE-BSD FRML MDRD: >60 ML/MIN/1.73
GLUCOSE BLD-MCNC: 140 MG/DL (ref 74–99)
GLUCOSE URINE: NEGATIVE MG/DL
HCT VFR BLD CALC: 41.6 % (ref 37–54)
HEMOGLOBIN: 14.3 G/DL (ref 12.5–16.5)
IMMATURE GRANULOCYTES #: 0.01 E9/L
IMMATURE GRANULOCYTES %: 0.2 % (ref 0–5)
INFLUENZA A BY PCR: NOT DETECTED
INFLUENZA B BY PCR: NOT DETECTED
KETONES, URINE: NEGATIVE MG/DL
LACTIC ACID: 1.5 MMOL/L (ref 0.5–2.2)
LEUKOCYTE ESTERASE, URINE: NEGATIVE
LYMPHOCYTES ABSOLUTE: 0.58 E9/L (ref 1.5–4)
LYMPHOCYTES RELATIVE PERCENT: 11.6 % (ref 20–42)
MCH RBC QN AUTO: 30.4 PG (ref 26–35)
MCHC RBC AUTO-ENTMCNC: 34.4 % (ref 32–34.5)
MCV RBC AUTO: 88.3 FL (ref 80–99.9)
MONOCYTES ABSOLUTE: 0.61 E9/L (ref 0.1–0.95)
MONOCYTES RELATIVE PERCENT: 12.2 % (ref 2–12)
NEUTROPHILS ABSOLUTE: 3.77 E9/L (ref 1.8–7.3)
NEUTROPHILS RELATIVE PERCENT: 75.2 % (ref 43–80)
NITRITE, URINE: NEGATIVE
PDW BLD-RTO: 11.9 FL (ref 11.5–15)
PH UA: 6 (ref 5–9)
PLATELET # BLD: 207 E9/L (ref 130–450)
PMV BLD AUTO: 10.4 FL (ref 7–12)
POTASSIUM REFLEX MAGNESIUM: 3.9 MMOL/L (ref 3.5–5)
PROTEIN UA: NEGATIVE MG/DL
RBC # BLD: 4.71 E12/L (ref 3.8–5.8)
SARS-COV-2, NAAT: DETECTED
SODIUM BLD-SCNC: 133 MMOL/L (ref 132–146)
SPECIFIC GRAVITY UA: 1.02 (ref 1–1.03)
TOTAL PROTEIN: 6.2 G/DL (ref 6.4–8.3)
TROPONIN, HIGH SENSITIVITY: <6 NG/L (ref 0–11)
UROBILINOGEN, URINE: 0.2 E.U./DL
WBC # BLD: 5 E9/L (ref 4.5–11.5)

## 2022-11-24 PROCEDURE — 84484 ASSAY OF TROPONIN QUANT: CPT

## 2022-11-24 PROCEDURE — 6360000004 HC RX CONTRAST MEDICATION: Performed by: RADIOLOGY

## 2022-11-24 PROCEDURE — 93005 ELECTROCARDIOGRAM TRACING: CPT | Performed by: PHYSICIAN ASSISTANT

## 2022-11-24 PROCEDURE — 71046 X-RAY EXAM CHEST 2 VIEWS: CPT

## 2022-11-24 PROCEDURE — 85378 FIBRIN DEGRADE SEMIQUANT: CPT

## 2022-11-24 PROCEDURE — 83605 ASSAY OF LACTIC ACID: CPT

## 2022-11-24 PROCEDURE — 87635 SARS-COV-2 COVID-19 AMP PRB: CPT

## 2022-11-24 PROCEDURE — 87088 URINE BACTERIA CULTURE: CPT

## 2022-11-24 PROCEDURE — 74177 CT ABD & PELVIS W/CONTRAST: CPT

## 2022-11-24 PROCEDURE — 80053 COMPREHEN METABOLIC PANEL: CPT

## 2022-11-24 PROCEDURE — 2580000003 HC RX 258: Performed by: PHYSICIAN ASSISTANT

## 2022-11-24 PROCEDURE — 81003 URINALYSIS AUTO W/O SCOPE: CPT

## 2022-11-24 PROCEDURE — 87502 INFLUENZA DNA AMP PROBE: CPT

## 2022-11-24 PROCEDURE — 99285 EMERGENCY DEPT VISIT HI MDM: CPT

## 2022-11-24 PROCEDURE — 85025 COMPLETE CBC W/AUTO DIFF WBC: CPT

## 2022-11-24 RX ORDER — 0.9 % SODIUM CHLORIDE 0.9 %
1000 INTRAVENOUS SOLUTION INTRAVENOUS ONCE
Status: COMPLETED | OUTPATIENT
Start: 2022-11-24 | End: 2022-11-24

## 2022-11-24 RX ADMIN — SODIUM CHLORIDE 1000 ML: 9 INJECTION, SOLUTION INTRAVENOUS at 11:43

## 2022-11-24 RX ADMIN — IOPAMIDOL 75 ML: 755 INJECTION, SOLUTION INTRAVENOUS at 12:45

## 2022-11-24 NOTE — ED PROVIDER NOTES
ED Attending shared visit  CC: No                                                                                                                                      Department of Emergency Medicine   ED  Provider Note  Admit Date/RoomTime: 11/24/2022 10:45 AM  ED Room: 08/08        HPI:  11/24/22,   Time: 12:34 PM JOSE Meade is a 40 y.o. male presenting to the ED for chills, CP, cough and SOB, beginning months ago. The complaint has been intermittent, moderate in severity, and worsened by nothing. The patient states that he has been sick for 6 months or more. He reports intermittent shaking chills and hot flashes. He is also having some left-sided abdominal pain which is being worked up by Dr. Mallorie Rashid and Dr. Emily Vizcaino. He has had numerous CAT scans. The patient had a recent EGD and was supposed to get a colonoscopy but admits that he ate a salad the night before and it had to be rescheduled. The patient is also having some left-sided chest pain and made an appointment with Dr. Lani Mccrary. He reports chronic tobacco abuse with intermittent cough and shortness of breath. Patient is here today because last night he had a episode of extreme shaking chills and he felt \"out of it\" he did not lose consciousness at any point and has no prior history of seizure disorder. There has been no documented fevers. Denies any rashes or open sores. No vomiting or diarrhea reported. He states that his bowels are irregular chronically.         ROS:     Constitutional:  See HPI  HENT: Negative for ear pain, sore throat and sinus pressure  Eyes: Negative for pain, discharge and redness  Respiratory: See HPI  Cardiovascular:  See HPI  Gastrointestinal: See HPI  Genitourinary: Negative for dysuria and frequency  Musculoskeletal: Negative for back pain and arthralgia  Skin: Negative for rash and wound  Neurological: Negative for weakness and headaches  Hematological: Negative for adenopathy    All other systems reviewed and are negative      -------------------------------- PAST HISTORY ----------------------------------  Past Medical History:  has a past medical history of Arm pain, Hepatitis, Intussusception ([address] Neck pain, Osteoarthritis of spine with radiculopathy, cervical region, and Shoulder pain. Past Surgical History:  has no past surgical history on file. Social History:  reports that he has been smoking cigarettes. He has been smoking an average of 1 pack per day. He has quit using smokeless tobacco. He reports current alcohol use. He reports current drug use. Frequency: 7.00 times per week. Drug: Marijuana Josiane Osei). Family History: family history includes Diabetes in his mother; Heart Disease in his father; High Blood Pressure in his mother. The patients home medications have been reviewed. Allergies: Patient has no known allergies.     --------------------------------- RESULTS ------------------------------------------  All laboratory and radiology results have been personally reviewed by myself   LABS:  Results for orders placed or performed during the hospital encounter of 11/24/22   COVID-19, Rapid    Specimen: Nasopharyngeal Swab   Result Value Ref Range    SARS-CoV-2, NAAT DETECTED (A) Not Detected   Rapid influenza A/B antigens    Specimen: Nares   Result Value Ref Range    Influenza A by PCR Not Detected Not Detected    Influenza B by PCR Not Detected Not Detected   CBC with Auto Differential   Result Value Ref Range    WBC 5.0 4.5 - 11.5 E9/L    RBC 4.71 3.80 - 5.80 E12/L    Hemoglobin 14.3 12.5 - 16.5 g/dL    Hematocrit 41.6 37.0 - 54.0 %    MCV 88.3 80.0 - 99.9 fL    MCH 30.4 26.0 - 35.0 pg    MCHC 34.4 32.0 - 34.5 %    RDW 11.9 11.5 - 15.0 fL    Platelets 109 869 - 366 E9/L    MPV 10.4 7.0 - 12.0 fL    Neutrophils % 75.2 43.0 - 80.0 %    Immature Granulocytes % 0.2 0.0 - 5.0 %    Lymphocytes % 11.6 (L) 20.0 - 42.0 %    Monocytes % 12.2 (H) 2.0 - 12.0 %    Eosinophils % 0.2 0.0 - 6.0 %    Basophils % 0.6 0.0 - 2.0 %    Neutrophils Absolute 3.77 1.80 - 7.30 E9/L    Immature Granulocytes # 0.01 E9/L    Lymphocytes Absolute 0.58 (L) 1.50 - 4.00 E9/L    Monocytes Absolute 0.61 0.10 - 0.95 E9/L    Eosinophils Absolute 0.01 (L) 0.05 - 0.50 E9/L    Basophils Absolute 0.03 0.00 - 0.20 E9/L   Comprehensive Metabolic Panel w/ Reflex to MG   Result Value Ref Range    Sodium 133 132 - 146 mmol/L    Potassium reflex Magnesium 3.9 3.5 - 5.0 mmol/L    Chloride 99 98 - 107 mmol/L    CO2 24 22 - 29 mmol/L    Anion Gap 10 7 - 16 mmol/L    Glucose 140 (H) 74 - 99 mg/dL    BUN 11 6 - 20 mg/dL    Creatinine 0.8 0.7 - 1.2 mg/dL    Est, Glom Filt Rate >60 >=60 mL/min/1.73    Calcium 8.8 8.6 - 10.2 mg/dL    Total Protein 6.2 (L) 6.4 - 8.3 g/dL    Albumin 3.9 3.5 - 5.2 g/dL    Total Bilirubin <0.2 0.0 - 1.2 mg/dL    Alkaline Phosphatase 58 40 - 129 U/L    ALT 20 0 - 40 U/L    AST 22 0 - 39 U/L   Troponin   Result Value Ref Range    Troponin, High Sensitivity <6 0 - 11 ng/L   Lactic Acid   Result Value Ref Range    Lactic Acid 1.5 0.5 - 2.2 mmol/L   Urinalysis   Result Value Ref Range    Color, UA Yellow Straw/Yellow    Clarity, UA Clear Clear    Glucose, Ur Negative Negative mg/dL    Bilirubin Urine Negative Negative    Ketones, Urine Negative Negative mg/dL    Specific Gravity, UA 1.025 1.005 - 1.030    Blood, Urine Negative Negative    pH, UA 6.0 5.0 - 9.0    Protein, UA Negative Negative mg/dL    Urobilinogen, Urine 0.2 <2.0 E.U./dL    Nitrite, Urine Negative Negative    Leukocyte Esterase, Urine Negative Negative   D-Dimer, Quantitative   Result Value Ref Range    D-Dimer, Quant <200 ng/mL DDU   EKG 12 Lead   Result Value Ref Range    Ventricular Rate 77 BPM    Atrial Rate 77 BPM    P-R Interval 156 ms    QRS Duration 100 ms    Q-T Interval 378 ms    QTc Calculation (Bazett) 427 ms    P Axis 65 degrees    R Axis 84 degrees    T Axis 73 degrees       EKG #1:  Interpreted by emergency department attending physician unless otherwise noted. 11/24/22  Time: 1129    Rhythm: normal sinus   Rate: normal  Axis: normal  Conduction: normal  ST Segments: no acute change  T Waves: no acute change    Clinical Impression: no acute changes  Comparison to Prior tracings:  Today's ECG is unchanged from previous tracings. RADIOLOGY:  Interpreted by Radiologist.  XR CHEST (2 VW)   Final Result   No evidence of acute cardiopulmonary disease is seen. CT ABDOMEN PELVIS W IV CONTRAST Additional Contrast? None   Final Result   Mildly dilated and thickened loops of proximal small bowel in the left mid   abdomen, concerning for localized enteritis. No evidence of obstructive uropathy or acute appendicitis. Constipation.             ----------------- NURSING NOTES AND VITALS REVIEWED ---------------   The nursing notes within the ED encounter and vital signs as below have been reviewed. /65   Pulse 76   Temp 98.7 °F (37.1 °C)   Resp 16   SpO2 95%   Oxygen Saturation Interpretation: Normal      --------------------------------PHYSICAL EXAM------------------------------------      Constitutional/General: Alert and oriented x3, well appearing, non toxic in NAD  Head: NC/AT  Eyes: PERRL, EOMI  TM's intact and clear. Mouth: Oropharynx clear, handling secretions, no trismus  Neck: Supple, full ROM, no meningeal signs  Pulmonary: Lungs clear to auscultation bilaterally, no wheezes, rales, or rhonchi. Not in respiratory distress  Cardiovascular:  Regular rate and rhythm, no murmurs, gallops, or rubs. 2+ distal pulses  Abdomen: Soft, + BS. No distension. Nontender. No palpable rigidity, rebound or guarding  Extremities: Moves all extremities x 4. Warm and well perfused  Skin: Numerous tattoos. No visible rash or erythema  Neurologic: GCS 15,  Intact.   No focal deficits  Psych: Normal Affect      ------------------------ ED COURSE/MEDICAL DECISION MAKING----------------------  Medications   0.9 % sodium chloride bolus (0 mLs IntraVENous Stopped 11/24/22 1310)   iopamidol (ISOVUE-370) 76 % injection 75 mL (75 mLs IntraVENous Given 11/24/22 1245)         Medical Decision Making:    A full work-up was initiated. Seen and evaluated with Dr. Brennen Pink. The patient had no leukocytosis or anemia. His renal function is intact. No elevation in liver enzymes. His influenza swabs were negative. He did test positive for COVID-19. His wife states that everyone has been sick at home with the kids starting at about a week ago with congestion cough and body aches. The patient states that he does not believe that this is all that is going on because his symptoms have been intermittent now for many months. I advised him to follow-up as planned with Dr. Sixto Leon and Dr. Kamilla Avelar for further imaging and testing. Otherwise we did not find any other sign of infection. His urine was clear. Chest x-ray normal.  CAT scan of the abdomen and pelvis showed mild small bowel enteritis, likely viral.  Plan discharge home with instructions for symptomatic care. He is aware and agreeable to this plan. ED Course as of 11/24/22 1431   Thu Nov 24, 2022   1248   ATTENDING PROVIDER ATTESTATION:     I have personally performed and/or participated in the history, exam, medical decision making, and procedures and agree with all pertinent clinical information unless otherwise noted. I have also reviewed and agree with the past medical, family and social history unless otherwise noted. I have discussed this patient in detail with the resident and provided the instruction and education regarding the evidence-based evaluation and treatment of abdominal pain and chest pain. Any EKG that may have been performed has been personally reviewed by me and I agree with the documentation as noted by the resident. History: Patient presents to the ED with complaint of abdominal pain and chest pain which have been ongoing.   He states he has had the abdominal pain for several months and has been evaluated by GI and general surgery. He also reports having chest pain that has been present for the past month or more. He states they have been ongoing. Has not noticed anything to make them better. Abdominal pain worse with eating. Has associated nausea, vomiting, and diarrhea. Also reports some chest congestion and cough. My findings: Xuan Marks is a 40 y.o. male whom is in no distress. Physical exam reveals patient sitting in bed comfortably. Heart regular rate and rhythm. Lungs coarse to auscultation without evidence of conversational dyspnea, accessory muscle use, or tachypnea. Abdomen reveals mild upper abdominal tenderness without rebound or guarding. No CVA tenderness. My plan: Symptomatic and supportive care. Appropriate labs and imaging. Electronically signed by Nu Mitchell DO on 11/24/22 at 12:48 PM EST      [MS]      ED Course User Index  [MS] Nu Mitchell DO       Counseling: The emergency provider has spoken with the patient and spouse/SO and discussed todays results, in addition to providing specific details for the plan of care and counseling regarding the diagnosis and prognosis. Questions are answered at this time and they are agreeable with the plan.      ------------------------ IMPRESSION AND DISPOSITION -------------------------------    IMPRESSION  1. COVID-19    2. Chills    3.  Enteritis        DISPOSITION  Disposition: Discharge to home  Patient condition is stable                   Inés Leblanc PA-C  11/24/22 4100

## 2022-11-24 NOTE — ED NOTES
Discharge instructions reviewed, patient verbalized understanding.       Georgean Goodpasture, RN  11/24/22 3125

## 2022-11-25 LAB
EKG ATRIAL RATE: 77 BPM
EKG P AXIS: 65 DEGREES
EKG P-R INTERVAL: 156 MS
EKG Q-T INTERVAL: 378 MS
EKG QRS DURATION: 100 MS
EKG QTC CALCULATION (BAZETT): 427 MS
EKG R AXIS: 84 DEGREES
EKG T AXIS: 73 DEGREES
EKG VENTRICULAR RATE: 77 BPM

## 2022-11-25 PROCEDURE — 93010 ELECTROCARDIOGRAM REPORT: CPT | Performed by: INTERNAL MEDICINE

## 2022-11-26 LAB — URINE CULTURE, ROUTINE: NORMAL

## 2022-12-15 ENCOUNTER — OFFICE VISIT (OUTPATIENT)
Dept: SURGERY | Age: 42
End: 2022-12-15

## 2022-12-15 VITALS
WEIGHT: 157 LBS | SYSTOLIC BLOOD PRESSURE: 133 MMHG | HEIGHT: 72 IN | BODY MASS INDEX: 21.26 KG/M2 | DIASTOLIC BLOOD PRESSURE: 82 MMHG | HEART RATE: 100 BPM | TEMPERATURE: 98.1 F | RESPIRATION RATE: 18 BRPM

## 2022-12-15 DIAGNOSIS — K52.9 COLITIS: Primary | ICD-10-CM

## 2022-12-15 PROCEDURE — 99024 POSTOP FOLLOW-UP VISIT: CPT | Performed by: SURGERY

## 2022-12-15 NOTE — PROGRESS NOTES
Surgery Progress Note            Chief complaint:   Chief Complaint   Patient presents with    Follow-up     Abdominal pain      Patient Active Problem List   Diagnosis    Cervical radiculopathy    Ischemic peripheral neuropathy    Osteoarthritis of spine with radiculopathy, cervical region    Cervical disc displacement    Skin irritation    Irritant contact dermatitis    BPPV (benign paroxysmal positional vertigo)    Family hx of hypertension    Atypical chest pain    Peripheral neuropathy due to ischemia    Epigastric pain    RLQ abdominal pain    Family history of IHSS       S: improved since abx    O:   Vitals:    12/15/22 0925   BP: 133/82   Pulse: 100   Resp: 18   Temp: 98.1 °F (36.7 °C)     No intake or output data in the 24 hours ending 12/15/22 0945        Labs:  Lab Results   Component Value Date/Time    WBC 5.0 11/24/2022 11:25 AM    WBC 9.0 07/26/2022 11:55 PM    WBC 11.9 04/17/2022 12:28 PM    HGB 14.3 11/24/2022 11:25 AM    HGB 14.0 07/26/2022 11:55 PM    HGB 15.4 04/17/2022 12:28 PM    HCT 41.6 11/24/2022 11:25 AM    HCT 41.6 07/26/2022 11:55 PM    HCT 45.4 04/17/2022 12:28 PM     Lab Results   Component Value Date    CREATININE 0.8 11/24/2022    BUN 11 11/24/2022     11/24/2022    K 3.9 11/24/2022    CL 99 11/24/2022    CO2 24 11/24/2022     Lab Results   Component Value Date/Time    LIPASE 15 07/26/2022 11:55 PM    LIPASE 20 04/17/2022 12:28 PM    LIPASE 19 12/30/2021 06:44 AM         Physical exam:   /82   Pulse 100   Temp 98.1 °F (36.7 °C) (Temporal)   Resp 18   Ht 6' (1.829 m)   Wt 157 lb (71.2 kg)   BMI 21.29 kg/m²   General appearance: NAD  Head: NCAT  Neck: supple, no masses  Lungs: equal chest rise bilateral  Heart: S1S2 present  Abdomen: soft, nontender, nondistended  Skin; no lesions  Gu: no cva tenderness  Extremities: extremities normal, atraumatic, no cyanosis or edema    A:  colitis improved with abx and normal egd/colonoscopy    P: call if symptoms recur for another course of abx, otherwise follow up as needed    Emeterio Gonsalez MD, MD  12/15/2022

## 2023-01-18 ENCOUNTER — HOSPITAL ENCOUNTER (OUTPATIENT)
Dept: NON INVASIVE DIAGNOSTICS | Age: 43
Discharge: HOME | End: 2023-01-18
Payer: MEDICAID

## 2023-01-18 PROCEDURE — 93225 XTRNL ECG REC<48 HRS REC: CPT

## 2023-02-22 ENCOUNTER — HOSPITAL ENCOUNTER (OUTPATIENT)
Age: 43
Discharge: HOME | End: 2023-02-22
Payer: COMMERCIAL

## 2023-02-22 LAB
C-REACTIVE PROTEIN: 0.4 MG/DL (ref 0–0.4)
CHOLESTEROL, FASTING: 182 MG/DL (ref 0–199)
HBA1C MFR BLD: 5.4 % (ref 4–5.6)
HDLC SERPL-MCNC: 40 MG/DL
LDL CHOLESTEROL CALCULATED: 127 MG/DL (ref 0–99)
TRIGLYCERIDE, FASTING: 75 MG/DL (ref 0–149)
TSH SERPL DL<=0.05 MIU/L-ACNC: 3.29 UIU/ML (ref 0.27–4.2)
VLDLC SERPL CALC-MCNC: 15 MG/DL

## 2023-02-22 PROCEDURE — 84443 ASSAY THYROID STIM HORMONE: CPT

## 2023-02-22 PROCEDURE — 36415 COLL VENOUS BLD VENIPUNCTURE: CPT

## 2023-02-22 PROCEDURE — 80061 LIPID PANEL: CPT

## 2023-02-22 PROCEDURE — 83036 HEMOGLOBIN GLYCOSYLATED A1C: CPT

## 2023-02-22 PROCEDURE — 86140 C-REACTIVE PROTEIN: CPT

## 2023-02-23 ENCOUNTER — HOSPITAL ENCOUNTER (OUTPATIENT)
Dept: NON INVASIVE DIAGNOSTICS | Age: 43
Discharge: HOME | End: 2023-02-23
Payer: COMMERCIAL

## 2023-02-23 LAB
LV EF: 72 %
LVEF MODALITY: NORMAL

## 2023-02-23 PROCEDURE — 93306 TTE W/DOPPLER COMPLETE: CPT

## 2023-03-10 ENCOUNTER — HOSPITAL ENCOUNTER (OUTPATIENT)
Dept: NON INVASIVE DIAGNOSTICS | Age: 43
Discharge: HOME | End: 2023-03-10
Payer: COMMERCIAL

## 2023-03-10 VITALS — HEIGHT: 72 IN | WEIGHT: 170 LBS | BODY MASS INDEX: 23.03 KG/M2

## 2023-03-10 LAB
RHEUMATOID FACTOR: <10 IU/ML (ref 0–13)
SEDIMENTATION RATE, ERYTHROCYTE: 7 MM/HR (ref 0–15)

## 2023-03-10 PROCEDURE — 85651 RBC SED RATE NONAUTOMATED: CPT

## 2023-03-10 PROCEDURE — 86431 RHEUMATOID FACTOR QUANT: CPT

## 2023-03-10 PROCEDURE — 93017 CV STRESS TEST TRACING ONLY: CPT

## 2023-03-10 PROCEDURE — 36415 COLL VENOUS BLD VENIPUNCTURE: CPT

## 2023-03-22 ENCOUNTER — OFFICE VISIT (OUTPATIENT)
Dept: FAMILY MEDICINE CLINIC | Age: 43
End: 2023-03-22
Payer: COMMERCIAL

## 2023-03-22 VITALS
HEIGHT: 72 IN | BODY MASS INDEX: 23.03 KG/M2 | OXYGEN SATURATION: 98 % | HEART RATE: 100 BPM | RESPIRATION RATE: 17 BRPM | TEMPERATURE: 97.6 F | DIASTOLIC BLOOD PRESSURE: 77 MMHG | SYSTOLIC BLOOD PRESSURE: 118 MMHG | WEIGHT: 170 LBS

## 2023-03-22 DIAGNOSIS — K08.89 TOOTH ACHE: Primary | ICD-10-CM

## 2023-03-22 PROCEDURE — G8427 DOCREV CUR MEDS BY ELIG CLIN: HCPCS

## 2023-03-22 PROCEDURE — 99213 OFFICE O/P EST LOW 20 MIN: CPT

## 2023-03-22 PROCEDURE — 4004F PT TOBACCO SCREEN RCVD TLK: CPT

## 2023-03-22 PROCEDURE — G8420 CALC BMI NORM PARAMETERS: HCPCS

## 2023-03-22 PROCEDURE — G8484 FLU IMMUNIZE NO ADMIN: HCPCS

## 2023-03-22 RX ORDER — CLINDAMYCIN HYDROCHLORIDE 300 MG/1
300 CAPSULE ORAL 4 TIMES DAILY
Qty: 28 CAPSULE | Refills: 0 | Status: SHIPPED | OUTPATIENT
Start: 2023-03-22 | End: 2023-03-29

## 2023-03-22 SDOH — ECONOMIC STABILITY: INCOME INSECURITY: HOW HARD IS IT FOR YOU TO PAY FOR THE VERY BASICS LIKE FOOD, HOUSING, MEDICAL CARE, AND HEATING?: NOT HARD AT ALL

## 2023-03-22 SDOH — ECONOMIC STABILITY: FOOD INSECURITY: WITHIN THE PAST 12 MONTHS, YOU WORRIED THAT YOUR FOOD WOULD RUN OUT BEFORE YOU GOT MONEY TO BUY MORE.: NEVER TRUE

## 2023-03-22 SDOH — ECONOMIC STABILITY: FOOD INSECURITY: WITHIN THE PAST 12 MONTHS, THE FOOD YOU BOUGHT JUST DIDN'T LAST AND YOU DIDN'T HAVE MONEY TO GET MORE.: NEVER TRUE

## 2023-03-22 ASSESSMENT — PATIENT HEALTH QUESTIONNAIRE - PHQ9
SUM OF ALL RESPONSES TO PHQ QUESTIONS 1-9: 0
2. FEELING DOWN, DEPRESSED OR HOPELESS: 0
1. LITTLE INTEREST OR PLEASURE IN DOING THINGS: 0
SUM OF ALL RESPONSES TO PHQ QUESTIONS 1-9: 0
DEPRESSION UNABLE TO ASSESS: FUNCTIONAL CAPACITY MOTIVATION LIMITS ACCURACY
SUM OF ALL RESPONSES TO PHQ QUESTIONS 1-9: 0
SUM OF ALL RESPONSES TO PHQ9 QUESTIONS 1 & 2: 0
SUM OF ALL RESPONSES TO PHQ QUESTIONS 1-9: 0

## 2023-03-22 NOTE — PROGRESS NOTES
No trismus present. No facial edema or redness noted. No drooling. One caries present and no dentition noted. Respiratory:  Clear to auscultation and breath sounds equal.    CV: Regular rate and rhythm, normal heart sounds, without pathological murmurs, ectopy, gallops, or rubs. Skin:  No rashes, erythema or lesions present, unless noted elsewhere. .  Lymphatics: No lymphangitis or adenopathy noted. Neurological:  Alert and oriented. Motor functions intact. Lab / Imaging Results   (All laboratory and radiology results have been personally reviewed by myself)  Labs:      Imaging: All Radiology results interpreted by Radiologist unless otherwise noted. Assessment / Plan     Impression(s):  Lisandra Estevez was seen today for dental pain. Diagnoses and all orders for this visit:    Tooth ache    Other orders  -     clindamycin (CLEOCIN) 300 MG capsule; Take 1 capsule by mouth 4 times daily for 7 days    Disposition:  Disposition: Discharge to Home. Scripts written for Clindamycin, side effects discussed. Increase fluids and rest. Pt advised to schedule a f/u appt with either a private dentist or the [facility] dental clinic within 1 week (contact information provided). ED sooner if symptoms worsen or change. ED immediately with severe/worsening pain, dyspnea, dysphagia, trismus, drooling, fever, or facial edema. Pt states understanding and is in agreement with this care plan. All questions answered. ROCHELLE Turner - CNP    **This report was transcribed using voice recognition software. Every effort was made to ensure accuracy; however, inadvertent computerized transcription errors may be present.

## 2023-06-21 ENCOUNTER — HOSPITAL ENCOUNTER (EMERGENCY)
Age: 43
Discharge: HOME | End: 2023-06-22
Attending: EMERGENCY MEDICINE
Payer: COMMERCIAL

## 2023-06-21 ENCOUNTER — OFFICE VISIT (OUTPATIENT)
Dept: FAMILY MEDICINE CLINIC | Age: 43
End: 2023-06-21
Payer: COMMERCIAL

## 2023-06-21 VITALS
HEIGHT: 72 IN | WEIGHT: 165 LBS | DIASTOLIC BLOOD PRESSURE: 76 MMHG | TEMPERATURE: 98.1 F | BODY MASS INDEX: 22.35 KG/M2 | SYSTOLIC BLOOD PRESSURE: 118 MMHG | RESPIRATION RATE: 16 BRPM | HEART RATE: 98 BPM

## 2023-06-21 DIAGNOSIS — M25.561 CHRONIC PAIN OF RIGHT KNEE: ICD-10-CM

## 2023-06-21 DIAGNOSIS — G56.03 BILATERAL CARPAL TUNNEL SYNDROME: ICD-10-CM

## 2023-06-21 DIAGNOSIS — S29.011A MUSCLE STRAIN OF CHEST WALL, INITIAL ENCOUNTER: Primary | ICD-10-CM

## 2023-06-21 DIAGNOSIS — G89.29 CHRONIC PAIN OF RIGHT KNEE: ICD-10-CM

## 2023-06-21 DIAGNOSIS — R07.89 CHEST WALL PAIN: ICD-10-CM

## 2023-06-21 DIAGNOSIS — R51.9 NONINTRACTABLE HEADACHE, UNSPECIFIED CHRONICITY PATTERN, UNSPECIFIED HEADACHE TYPE: Primary | ICD-10-CM

## 2023-06-21 PROCEDURE — 93005 ELECTROCARDIOGRAM TRACING: CPT | Performed by: EMERGENCY MEDICINE

## 2023-06-21 PROCEDURE — 4004F PT TOBACCO SCREEN RCVD TLK: CPT | Performed by: NURSE PRACTITIONER

## 2023-06-21 PROCEDURE — G8420 CALC BMI NORM PARAMETERS: HCPCS | Performed by: NURSE PRACTITIONER

## 2023-06-21 PROCEDURE — 99284 EMERGENCY DEPT VISIT MOD MDM: CPT

## 2023-06-21 PROCEDURE — 96375 TX/PRO/DX INJ NEW DRUG ADDON: CPT

## 2023-06-21 PROCEDURE — 96374 THER/PROPH/DIAG INJ IV PUSH: CPT

## 2023-06-21 PROCEDURE — 99214 OFFICE O/P EST MOD 30 MIN: CPT | Performed by: NURSE PRACTITIONER

## 2023-06-21 PROCEDURE — G8427 DOCREV CUR MEDS BY ELIG CLIN: HCPCS | Performed by: NURSE PRACTITIONER

## 2023-06-21 RX ORDER — METOCLOPRAMIDE HYDROCHLORIDE 5 MG/ML
10 INJECTION INTRAMUSCULAR; INTRAVENOUS ONCE
Status: COMPLETED | OUTPATIENT
Start: 2023-06-21 | End: 2023-06-22

## 2023-06-21 RX ORDER — PREDNISONE 20 MG/1
20 TABLET ORAL DAILY
Qty: 3 TABLET | Refills: 0 | Status: SHIPPED | OUTPATIENT
Start: 2023-06-21 | End: 2023-06-24

## 2023-06-21 RX ORDER — KETOROLAC TROMETHAMINE 15 MG/ML
15 INJECTION, SOLUTION INTRAMUSCULAR; INTRAVENOUS ONCE
Status: COMPLETED | OUTPATIENT
Start: 2023-06-21 | End: 2023-06-22

## 2023-06-21 RX ORDER — DIPHENHYDRAMINE HYDROCHLORIDE 50 MG/ML
25 INJECTION INTRAMUSCULAR; INTRAVENOUS ONCE
Status: COMPLETED | OUTPATIENT
Start: 2023-06-21 | End: 2023-06-22

## 2023-06-21 ASSESSMENT — ENCOUNTER SYMPTOMS
WHEEZING: 0
EYE DISCHARGE: 0
VOMITING: 0
COUGH: 0
NAUSEA: 1
DIARRHEA: 0
BACK PAIN: 0
SHORTNESS OF BREATH: 0
CHEST TIGHTNESS: 0
PHOTOPHOBIA: 1
SORE THROAT: 0
TROUBLE SWALLOWING: 0
SINUS PRESSURE: 0
EYE REDNESS: 0
ABDOMINAL PAIN: 0
WHEEZING: 0
RHINORRHEA: 0
COUGH: 0
SHORTNESS OF BREATH: 0
EYE PAIN: 0

## 2023-06-21 NOTE — ASSESSMENT & PLAN NOTE
New problem per patient Dr Lia Blackman attempted to do EMG but was unable.  He is getting worse and states DR Lia Blackman told him he needed to stop working and go get on Welfare due to his chronic pain  Referral to Physical medicine for evaluation and treatment, Bilateral EMG    CT and MRI cervical degenerative changes

## 2023-06-21 NOTE — ASSESSMENT & PLAN NOTE
New problem chronic condition no imaging check x ray today, start PT if not improving will need to see ortho ?  MRI

## 2023-06-21 NOTE — PROGRESS NOTES
OFFICE PROGRESS NOTE  [address] [place] [address] Rd  [address] [address]  Dept: [phone]   Chief Complaint   Patient presents with    Chest Pain     Possible pulled muscle x2 days. Left side        ASSESSMENT/PLAN   1. Muscle strain of chest wall, initial encounter  Assessment & Plan:   New problem reproducible pain on palpation of the left chest wall. He started left over prednisone last night which has helped. Will give 3 more days of Prednisone 20 mg with food for inflammation. Orders:  -     predniSONE (DELTASONE) 20 MG tablet; Take 1 tablet by mouth daily for 3 days, Disp-3 tablet, R-0Normal  2. Bilateral carpal tunnel syndrome  Assessment & Plan:   New problem per patient Dr Joanie Shaw attempted to do EMG but was unable. He is getting worse and states DR Joanie Shaw told him he needed to stop working and go get on Welfare due to his chronic pain  Referral to Physical medicine for evaluation and treatment, Bilateral EMG  Orders:  -     Ambulatory referral to [address]  3. Chronic pain of right knee  Assessment & Plan:   New problem chronic condition no imaging check x ray today, start PT if not improving will need to see ortho ? MRI  Orders:  -     XR KNEE RIGHT (MIN 4 VIEWS); Future  -     Ambulatory referral to Physical Therapy       Reviewed labs:   Reviewed notes from   Reviewed radiology CT cervical, MRI cervical     Discussed reducing caffeine intake, Discussed smoking cessation, and Discussed taking medications as directed and adverse effects    Return in about 2 months (around 8/21/2023) for knee follow up. HPI:   He comes in today with complaints of his left side chest hurts when he breaths or coughs over the last 2 days, He did have prednisone 20 mg at home and had some left over so he took one last night and then again this morning. But it feels much better since taking the Prednisone. He says the Norco isn't touching his pain as he follows

## 2023-06-21 NOTE — ASSESSMENT & PLAN NOTE
New problem reproducible pain on palpation of the left chest wall. He started left over prednisone last night which has helped. Will give 3 more days of Prednisone 20 mg with food for inflammation.

## 2023-06-22 ENCOUNTER — APPOINTMENT (OUTPATIENT)
Dept: CT IMAGING | Age: 43
End: 2023-06-22
Payer: COMMERCIAL

## 2023-06-22 ENCOUNTER — APPOINTMENT (OUTPATIENT)
Dept: GENERAL RADIOLOGY | Age: 43
End: 2023-06-22
Payer: COMMERCIAL

## 2023-06-22 VITALS
RESPIRATION RATE: 19 BRPM | HEART RATE: 68 BPM | DIASTOLIC BLOOD PRESSURE: 79 MMHG | TEMPERATURE: 97.8 F | SYSTOLIC BLOOD PRESSURE: 123 MMHG | OXYGEN SATURATION: 97 %

## 2023-06-22 LAB
EKG ATRIAL RATE: 81 BPM
EKG P AXIS: 69 DEGREES
EKG P-R INTERVAL: 166 MS
EKG Q-T INTERVAL: 402 MS
EKG QRS DURATION: 106 MS
EKG QTC CALCULATION (BAZETT): 466 MS
EKG R AXIS: 82 DEGREES
EKG T AXIS: 57 DEGREES
EKG VENTRICULAR RATE: 81 BPM

## 2023-06-22 PROCEDURE — 71046 X-RAY EXAM CHEST 2 VIEWS: CPT

## 2023-06-22 PROCEDURE — 70450 CT HEAD/BRAIN W/O DYE: CPT

## 2023-06-22 PROCEDURE — 93010 ELECTROCARDIOGRAM REPORT: CPT | Performed by: INTERNAL MEDICINE

## 2023-06-22 PROCEDURE — 96375 TX/PRO/DX INJ NEW DRUG ADDON: CPT

## 2023-06-22 PROCEDURE — 6360000002 HC RX W HCPCS: Performed by: EMERGENCY MEDICINE

## 2023-06-22 PROCEDURE — 96374 THER/PROPH/DIAG INJ IV PUSH: CPT

## 2023-06-22 RX ADMIN — KETOROLAC TROMETHAMINE 15 MG: 15 INJECTION, SOLUTION INTRAMUSCULAR; INTRAVENOUS at 00:18

## 2023-06-22 RX ADMIN — DIPHENHYDRAMINE HYDROCHLORIDE 25 MG: 50 INJECTION, SOLUTION INTRAMUSCULAR; INTRAVENOUS at 00:18

## 2023-06-22 RX ADMIN — METOCLOPRAMIDE 10 MG: 5 INJECTION, SOLUTION INTRAMUSCULAR; INTRAVENOUS at 00:18

## 2023-06-22 ASSESSMENT — PAIN DESCRIPTION - LOCATION: LOCATION: HEAD

## 2023-06-22 ASSESSMENT — PAIN SCALES - GENERAL: PAINLEVEL_OUTOF10: 8

## 2023-06-22 NOTE — ED PROVIDER NOTES
06/21/23. Radiology:  XR CHEST (2 VW)   Final Result   No acute cardiopulmonary process. CT HEAD WO CONTRAST   Final Result   No acute intracranial abnormality.             ------------------------- NURSING NOTES AND VITALS REVIEWED ---------------------------  Date / Time Roomed:  6/21/2023 11:01 PM  ED Bed Assignment:  08/08    The nursing notes within the ED encounter and vital signs as below have been reviewed. /70   Pulse 67   Temp 97.8 °F (36.6 °C)   Resp 19   SpO2 96%   Oxygen Saturation Interpretation: Normal      ------------------------------------------ PROGRESS NOTES ------------------------------------------  I have spoken with the patient and discussed todays results, in addition to providing specific details for the plan of care and counseling regarding the diagnosis and prognosis. Their questions are answered at this time and they are agreeable with the plan. I discussed at length with them reasons for immediate return here for re evaluation. They will followup with primary care by calling their office tomorrow. --------------------------------- ADDITIONAL PROVIDER NOTES ---------------------------------  At this time the patient is without objective evidence of an acute process requiring hospitalization or inpatient management. They have remained hemodynamically stable throughout their entire ED visit and are stable for discharge with outpatient follow-up. The plan has been discussed in detail and they are aware of the specific conditions for emergent return, as well as the importance of follow-up. New Prescriptions    No medications on file       Diagnosis:  1. Nonintractable headache, unspecified chronicity pattern, unspecified headache type    2. Chest wall pain        Disposition:  Patient's disposition: Discharge to home  Patient's condition is stable.          Lilo Jump, DO  06/22/23 7362

## 2023-07-12 ENCOUNTER — OFFICE VISIT (OUTPATIENT)
Dept: FAMILY MEDICINE CLINIC | Age: 43
End: 2023-07-12
Payer: COMMERCIAL

## 2023-07-12 VITALS
OXYGEN SATURATION: 98 % | SYSTOLIC BLOOD PRESSURE: 135 MMHG | WEIGHT: 165 LBS | TEMPERATURE: 97.3 F | BODY MASS INDEX: 22.35 KG/M2 | HEIGHT: 72 IN | RESPIRATION RATE: 17 BRPM | HEART RATE: 73 BPM | DIASTOLIC BLOOD PRESSURE: 65 MMHG

## 2023-07-12 DIAGNOSIS — R10.84 GENERALIZED ABDOMINAL PAIN: Primary | ICD-10-CM

## 2023-07-12 PROCEDURE — 99212 OFFICE O/P EST SF 10 MIN: CPT

## 2023-07-12 PROCEDURE — 4004F PT TOBACCO SCREEN RCVD TLK: CPT

## 2023-07-12 PROCEDURE — G8420 CALC BMI NORM PARAMETERS: HCPCS

## 2023-07-12 PROCEDURE — G8427 DOCREV CUR MEDS BY ELIG CLIN: HCPCS

## 2023-07-12 NOTE — PROGRESS NOTES
23  Tamara Duncan : 1980 Sex: male  Age 44 y.o. Subjective:  Chief Complaint   Patient presents with    Abdominal Pain     Across whole abdomen  started a week ago        HPI:   Tamara Duncan , 44 y.o. male presents to the clinic for evaluation of abdominal pain x 7 days. The patient also reports abdominal pain for 3 years acute pain started a week ago. Patient has been seen by GI and Surgeon at [facility]. The patient denies radiating pain. The patient describes the pain as sharp. The patient has taken narcotic medication for symptoms. The patient reports worsening symptoms over time. The patient denies constipation, hematuria, and dysuria. The patient also denies mucus in stool, melena, hematemesis, hematochezia, coffee-ground emesis. The patient also denies headache, fever, chest pain, shortness of breath, and nausea / vomiting / diarrhea. ROS:   Unless otherwise stated in this report the patient's positive and negative responses for review of systems for constitutional, eyes, ENT, cardiovascular, respiratory, gastrointestinal, neurological, , musculoskeletal, and integument systems and related systems to the presenting problem are either stated in the history of present illness or were not pertinent or were negative for the symptoms and/or complaints related to the presenting medical problem. Positives and pertinent negatives as per HPI. All others reviewed and are negative. PMH:     Past Medical History:   Diagnosis Date    Arm pain     arm pain and numbness    H/O cardiovascular stress test 03/10/2023    Exercise Treadmill Stress    Hepatitis     History of Holter monitoring 2023    Intussusception ([address]) 2022    Neck pain     Osteoarthritis of spine with radiculopathy, cervical region 2016    Shoulder pain        No past surgical history on file.     Family History   Problem Relation Age of Onset    Diabetes Mother     High Blood Pressure Mother     Heart

## 2023-07-13 ENCOUNTER — PROCEDURE VISIT (OUTPATIENT)
Dept: PHYSICAL MEDICINE AND REHAB | Age: 43
End: 2023-07-13

## 2023-07-13 VITALS — BODY MASS INDEX: 22.35 KG/M2 | WEIGHT: 165 LBS | HEIGHT: 72 IN

## 2023-07-13 DIAGNOSIS — G56.03 BILATERAL CARPAL TUNNEL SYNDROME: Primary | ICD-10-CM

## 2023-07-13 DIAGNOSIS — G56.01 CARPAL TUNNEL SYNDROME OF RIGHT WRIST: Primary | ICD-10-CM

## 2023-07-13 NOTE — PROGRESS NOTES
Electrodiagnostic Laboratory  *Accredited by the [facility] with exemplary status  [address], [address] [place] [address]  Phone: [phone]  Fax: [phone]      Date of Examination: 07/13/23    Patient Name: Natalia Duckworth    An independent historian was not needed. Natalia Duckworth  is a 44y.o. year old male who was seen today regarding   Chief Complaint   Patient presents with    Extremity Pain     Right hand hurts worse than the left. Pain described as stabbing. Pain rated 9/10. Symptoms for 2 years with no acute injury. Numbness     Bilateral hand numbness that worsens at night. Extremity Weakness     Bilateral hand weakness. .     The symptoms started after no injury. The symptoms are intermittent. I have reviewed the referring provider's office note. There is not a family history of neuromuscular conditions. Physical Exam: General: The patient is in no apparent distress. MSK: There is no joint effusion, deformity, instability, swelling, erythema or warmth. AROM is full in the spine and extremities. +Tinel bilateral wrists. Neurologic:  No focal sensorimotor deficit. Reflexes 2+ and symmetric. Gait is normal.    Impression:     1. Carpal tunnel syndrome of right wrist        Plan:   EMG is indicated to evaluate the above diagnosis. Orders Placed This Encounter   Procedures    ID NEEDLE EMG EA EXTREMTY W/PARASPINL AREA COMPLETE    ID NERVE CONDUCTION STUDIES 11-12 STUDIES     EMG was done today and showed right median mononeuropathy clinically consistent with carpal tunnel syndrome. Recommend neutral wrist splints at h.s., OT and/or carpal tunnel injection and if no improvement after 4-6 weeks of conservative treatments consider orthopedic surgery evaluation. Recommend repeating the EMG in 1 year if symptoms persist.    The patient was educated about the diagnosis and the prognosis. Advised patient to follow up with referring provider.

## 2023-07-13 NOTE — PATIENT INSTRUCTIONS
Electrodiagnotic Laboratory  Accredited by the [facility] with Exemplary status  BERTA Brown D.O. [facility]  [address]. [facility] [address], [address] [place] [address]  Phone: [phone]  Fax: [phone]        Today you had an electrodiagnostic exam which included nerve conduction studies (NCS) and electromyography (EMG). This test evaluated the electrical activity of your nerves and muscles to help determine if you have a nerve or muscle disease. This test can help determine the location and type of a nerve or muscle problem. This will help your referring doctor diagnose your condition and determine the appropriate next step in your treatment plan. After your test:    1. There are no long lasting side effects of the test.     2. You may resume your normal activities without restrictions. 3.  Resume any medications that were stopped for the test.     4  If you have sore areas or bruising in your muscles where the needle was placed, apply a cold pack to the sore area for 15-20 minutes three to four times a day as needed for pain. The soreness should go away in about 1-2 days. 5. Your results were provided  Briefly at the end of your test and the final detailed report will be provided to your referring physician, and/or primary care physician and any other parties you requested within 1-2 days of the examination. You may wish to contact your referring provider after a few days to determine what they would like you to do next. 6.  Please call [phone] with any questions or concerns and if you develop increased body temperature/fever, swelling, tenderness, increased pain and/or drainage from the sites where the needle was placed. Thank you for choosing us for your health care needs.

## 2023-07-14 ENCOUNTER — HOSPITAL ENCOUNTER (OUTPATIENT)
Age: 43
Discharge: HOME | End: 2023-07-14
Payer: COMMERCIAL

## 2023-07-14 LAB
ALBUMIN SERPL-MCNC: 4.7 G/DL (ref 3.5–5.2)
ALP SERPL-CCNC: 81 U/L (ref 40–129)
ALT SERPL-CCNC: 26 U/L (ref 0–40)
ANION GAP SERPL CALCULATED.3IONS-SCNC: 12 MMOL/L (ref 7–16)
AST SERPL-CCNC: 18 U/L (ref 0–39)
BASOPHILS # BLD: 0.09 E9/L (ref 0–0.2)
BASOPHILS NFR BLD: 1.2 % (ref 0–2)
BILIRUB SERPL-MCNC: 0.4 MG/DL (ref 0–1.2)
BUN SERPL-MCNC: 17 MG/DL (ref 6–20)
CALCIUM SERPL-MCNC: 9.5 MG/DL (ref 8.6–10.2)
CHLORIDE SERPL-SCNC: 102 MMOL/L (ref 98–107)
CO2 SERPL-SCNC: 27 MMOL/L (ref 22–29)
CREAT SERPL-MCNC: 0.8 MG/DL (ref 0.7–1.2)
CRP SERPL HS-MCNC: <0.3 MG/DL (ref 0–0.4)
EOSINOPHIL # BLD: 0.1 E9/L (ref 0.05–0.5)
EOSINOPHIL NFR BLD: 1.4 % (ref 0–6)
ERYTHROCYTE [DISTWIDTH] IN BLOOD BY AUTOMATED COUNT: 12 FL (ref 11.5–15)
ERYTHROCYTE [SEDIMENTATION RATE] IN BLOOD BY WESTERGREN METHOD: 2 MM/HR (ref 0–15)
GLUCOSE FASTING: 115 MG/DL (ref 74–99)
HCT VFR BLD AUTO: 48.2 % (ref 37–54)
HGB BLD-MCNC: 16.2 G/DL (ref 12.5–16.5)
IMM GRANULOCYTES # BLD: 0.02 E9/L
IMM GRANULOCYTES NFR BLD: 0.3 % (ref 0–5)
LYMPHOCYTES # BLD: 2.15 E9/L (ref 1.5–4)
LYMPHOCYTES NFR BLD: 29.6 % (ref 20–42)
MCH RBC QN AUTO: 29.6 PG (ref 26–35)
MCHC RBC AUTO-ENTMCNC: 33.6 % (ref 32–34.5)
MCV RBC AUTO: 88.1 FL (ref 80–99.9)
MONOCYTES # BLD: 0.45 E9/L (ref 0.1–0.95)
MONOCYTES NFR BLD: 6.2 % (ref 2–12)
NEUTROPHILS # BLD: 4.46 E9/L (ref 1.8–7.3)
NEUTS SEG NFR BLD: 61.3 % (ref 43–80)
PLATELET # BLD AUTO: 310 E9/L (ref 130–450)
PMV BLD AUTO: 10 FL (ref 7–12)
POTASSIUM SERPL-SCNC: 4.9 MMOL/L (ref 3.5–5)
PROT SERPL-MCNC: 7.3 G/DL (ref 6.4–8.3)
RBC # BLD AUTO: 5.47 E12/L (ref 3.8–5.8)
SODIUM SERPL-SCNC: 141 MMOL/L (ref 132–146)
WBC # BLD: 7.3 E9/L (ref 4.5–11.5)

## 2023-07-14 PROCEDURE — 85025 COMPLETE CBC W/AUTO DIFF WBC: CPT

## 2023-07-14 PROCEDURE — 86140 C-REACTIVE PROTEIN: CPT

## 2023-07-14 PROCEDURE — 36415 COLL VENOUS BLD VENIPUNCTURE: CPT

## 2023-07-14 PROCEDURE — 80053 COMPREHEN METABOLIC PANEL: CPT

## 2023-07-14 PROCEDURE — 85651 RBC SED RATE NONAUTOMATED: CPT

## 2023-07-17 ENCOUNTER — TELEPHONE (OUTPATIENT)
Dept: PHYSICAL THERAPY | Age: 43
End: 2023-07-17

## 2023-07-17 NOTE — TELEPHONE ENCOUNTER
Date: 2023       Patient Name: Santos Gonzalez  : 1980      MRN: [id number]    No show/no call for PT evaluation scheduled at [phone] today.     Narcisa Banda, PT

## 2024-02-27 ENCOUNTER — OFFICE VISIT (OUTPATIENT)
Dept: FAMILY MEDICINE CLINIC | Age: 44
End: 2024-02-27
Payer: COMMERCIAL

## 2024-02-27 VITALS
BODY MASS INDEX: 23.3 KG/M2 | RESPIRATION RATE: 16 BRPM | HEART RATE: 83 BPM | SYSTOLIC BLOOD PRESSURE: 128 MMHG | HEIGHT: 72 IN | TEMPERATURE: 97.2 F | OXYGEN SATURATION: 98 % | WEIGHT: 172 LBS | DIASTOLIC BLOOD PRESSURE: 78 MMHG

## 2024-02-27 DIAGNOSIS — R68.82 LOW LIBIDO: ICD-10-CM

## 2024-02-27 DIAGNOSIS — R53.83 FATIGUE, UNSPECIFIED TYPE: Primary | ICD-10-CM

## 2024-02-27 PROCEDURE — 4004F PT TOBACCO SCREEN RCVD TLK: CPT | Performed by: NURSE PRACTITIONER

## 2024-02-27 PROCEDURE — G8427 DOCREV CUR MEDS BY ELIG CLIN: HCPCS | Performed by: NURSE PRACTITIONER

## 2024-02-27 PROCEDURE — 99214 OFFICE O/P EST MOD 30 MIN: CPT | Performed by: NURSE PRACTITIONER

## 2024-02-27 PROCEDURE — G8484 FLU IMMUNIZE NO ADMIN: HCPCS | Performed by: NURSE PRACTITIONER

## 2024-02-27 PROCEDURE — G8420 CALC BMI NORM PARAMETERS: HCPCS | Performed by: NURSE PRACTITIONER

## 2024-02-27 ASSESSMENT — ENCOUNTER SYMPTOMS
COUGH: 0
WHEEZING: 0
SHORTNESS OF BREATH: 0

## 2024-02-27 ASSESSMENT — PATIENT HEALTH QUESTIONNAIRE - PHQ9
2. FEELING DOWN, DEPRESSED OR HOPELESS: 0
SUM OF ALL RESPONSES TO PHQ QUESTIONS 1-9: 0
SUM OF ALL RESPONSES TO PHQ QUESTIONS 1-9: 0
SUM OF ALL RESPONSES TO PHQ9 QUESTIONS 1 & 2: 0
1. LITTLE INTEREST OR PLEASURE IN DOING THINGS: 0
SUM OF ALL RESPONSES TO PHQ QUESTIONS 1-9: 0
SUM OF ALL RESPONSES TO PHQ QUESTIONS 1-9: 0

## 2024-02-27 NOTE — PROGRESS NOTES
OFFICE PROGRESS NOTE  [facility] [place] [facility]  [address] [place] [address]  Dept: [phone]   Chief Complaint   Patient presents with    Fatigue     X3 months or more.     Health Maintenance     Due for pne, flu,       ASSESSMENT/PLAN   1. Fatigue, unspecified type  Assessment & Plan:  New problem Uncontrolled labs ordered for 1 week fast lab before 10 am and no sex or masturbation for 72 hours.   Orders:  -     TSH; Future  -     T4, Free; Future  -     CBC with Auto Differential; Future  -     Comprehensive Metabolic Panel; Future  -     Hemoglobin A1C; Future  2. Low libido  Assessment & Plan:  New problem Uncontrolled labs ordered for 1 week fast lab before 10 am and no sex or masturbation for 72 hours.   Orders:  -     Luteinizing Hormone; Future  -     Follicle Stimulating Hormone; Future  -     Testosterone Free & Bio, Total; Future       Reviewed labs:   Reviewed notes from   Reviewed radiology     Discussed exercising 30 minutes daily and Discussed taking medications as directed and adverse effects    Return in about 3 weeks (around 3/19/2024) for lab results.     HPI:   Here today for complaints of fatigue, focus problems, poor performance as the desire isn't there no problems with getting an erection, He has cut back on the red bulls, watching his diet and putting on some weight as he is eating healthier. He denies any depression. States this has been going on for a long time more than 6 months but worse over the last 3 months. He is happy in his relationship.     Current Outpatient Medications:     HYDROcodone-acetaminophen (NORCO)  MG per tablet, TAKE 1 TABLET BY MOUTH FOUR TIMES A DAY, Disp: , Rfl:       Surgical History:  has no past surgical history on file.  Social History:  reports that he has been smoking cigarettes. He has quit using smokeless tobacco. He reports current alcohol use. He reports current drug use. Frequency: 7.00 times per

## 2024-02-27 NOTE — ASSESSMENT & PLAN NOTE
New problem Uncontrolled labs ordered for 1 week fast lab before 10 am and no sex or masturbation for 72 hours.

## 2024-02-27 NOTE — ASSESSMENT & PLAN NOTE
Uncontrolled labs ordered for 1 week fast lab before 10 am and no sex or masturbation for 72 hours.

## 2024-02-28 ENCOUNTER — HOSPITAL ENCOUNTER (OUTPATIENT)
Age: 44
Discharge: HOME | End: 2024-02-28
Payer: COMMERCIAL

## 2024-02-28 DIAGNOSIS — R53.83 FATIGUE, UNSPECIFIED TYPE: ICD-10-CM

## 2024-02-28 DIAGNOSIS — R68.82 LOW LIBIDO: ICD-10-CM

## 2024-02-28 LAB
ALBUMIN SERPL-MCNC: 4.3 G/DL (ref 3.5–5.2)
ALP SERPL-CCNC: 78 U/L (ref 40–129)
ALT SERPL-CCNC: 28 U/L (ref 0–40)
ANION GAP SERPL CALCULATED.3IONS-SCNC: 9 MMOL/L (ref 7–16)
AST SERPL-CCNC: 20 U/L (ref 0–39)
BASOPHILS # BLD: 0.11 K/UL (ref 0–0.2)
BASOPHILS NFR BLD: 1 % (ref 0–2)
BILIRUB SERPL-MCNC: 0.5 MG/DL (ref 0–1.2)
BUN SERPL-MCNC: 15 MG/DL (ref 6–20)
CALCIUM SERPL-MCNC: 9.1 MG/DL (ref 8.6–10.2)
CHLORIDE SERPL-SCNC: 103 MMOL/L (ref 98–107)
CO2 SERPL-SCNC: 26 MMOL/L (ref 22–29)
CREAT SERPL-MCNC: 0.9 MG/DL (ref 0.7–1.2)
EOSINOPHIL # BLD: 0.19 K/UL (ref 0.05–0.5)
EOSINOPHILS RELATIVE PERCENT: 2 % (ref 0–6)
ERYTHROCYTE [DISTWIDTH] IN BLOOD BY AUTOMATED COUNT: 12.8 % (ref 11.5–15)
FSH SERPL-ACNC: 3.6 MIU/ML
GFR SERPL CREATININE-BSD FRML MDRD: >60 ML/MIN/1.73M2
GLUCOSE SERPL-MCNC: 94 MG/DL (ref 74–99)
HBA1C MFR BLD: 5.6 % (ref 4–5.6)
HCT VFR BLD AUTO: 45.9 % (ref 37–54)
HGB BLD-MCNC: 15.9 G/DL (ref 12.5–16.5)
IMM GRANULOCYTES # BLD AUTO: 0.03 K/UL (ref 0–0.58)
IMM GRANULOCYTES NFR BLD: 0 % (ref 0–5)
LH SERPL-ACNC: 9 MIU/ML
LYMPHOCYTES NFR BLD: 1.89 K/UL (ref 1.5–4)
LYMPHOCYTES RELATIVE PERCENT: 24 % (ref 20–42)
MCH RBC QN AUTO: 29.7 PG (ref 26–35)
MCHC RBC AUTO-ENTMCNC: 34.6 G/DL (ref 32–34.5)
MCV RBC AUTO: 85.6 FL (ref 80–99.9)
MONOCYTES NFR BLD: 0.55 K/UL (ref 0.1–0.95)
MONOCYTES NFR BLD: 7 % (ref 2–12)
NEUTROPHILS NFR BLD: 65 % (ref 43–80)
NEUTS SEG NFR BLD: 5.14 K/UL (ref 1.8–7.3)
PLATELET # BLD AUTO: 315 K/UL (ref 130–450)
PMV BLD AUTO: 9.9 FL (ref 7–12)
POTASSIUM SERPL-SCNC: 4 MMOL/L (ref 3.5–5)
PROT SERPL-MCNC: 7.3 G/DL (ref 6.4–8.3)
RBC # BLD AUTO: 5.36 M/UL (ref 3.8–5.8)
SODIUM SERPL-SCNC: 138 MMOL/L (ref 132–146)
T4 FREE SERPL-MCNC: 1.2 NG/DL (ref 0.9–1.7)
TSH SERPL DL<=0.05 MIU/L-ACNC: 2.81 UIU/ML (ref 0.27–4.2)
WBC OTHER # BLD: 7.9 K/UL (ref 4.5–11.5)

## 2024-02-28 PROCEDURE — 84270 ASSAY OF SEX HORMONE GLOBUL: CPT

## 2024-02-28 PROCEDURE — 83001 ASSAY OF GONADOTROPIN (FSH): CPT

## 2024-02-28 PROCEDURE — 83002 ASSAY OF GONADOTROPIN (LH): CPT

## 2024-02-28 PROCEDURE — 84443 ASSAY THYROID STIM HORMONE: CPT

## 2024-02-28 PROCEDURE — 85025 COMPLETE CBC W/AUTO DIFF WBC: CPT

## 2024-02-28 PROCEDURE — 36415 COLL VENOUS BLD VENIPUNCTURE: CPT

## 2024-02-28 PROCEDURE — 84439 ASSAY OF FREE THYROXINE: CPT

## 2024-02-28 PROCEDURE — 83036 HEMOGLOBIN GLYCOSYLATED A1C: CPT

## 2024-02-28 PROCEDURE — 80053 COMPREHEN METABOLIC PANEL: CPT

## 2024-02-28 PROCEDURE — 84403 ASSAY OF TOTAL TESTOSTERONE: CPT

## 2024-02-29 LAB — HBA1C MFR BLD: 5.6 % (ref 4–5.6)

## 2024-03-02 LAB
SHBG SERPL-SCNC: 40 NMOL/L (ref 11–80)
TESTOST FREE MFR SERPL: 85.7 PG/ML (ref 47–244)
TESTOST SERPL-MCNC: 465 NG/DL (ref 220–1000)
TESTOSTERONE, BIOAVAILABLE: 200.8 NG/DL (ref 130–680)

## 2024-03-19 ENCOUNTER — OFFICE VISIT (OUTPATIENT)
Dept: FAMILY MEDICINE CLINIC | Age: 44
End: 2024-03-19
Payer: COMMERCIAL

## 2024-03-19 VITALS
DIASTOLIC BLOOD PRESSURE: 66 MMHG | RESPIRATION RATE: 16 BRPM | BODY MASS INDEX: 24.24 KG/M2 | TEMPERATURE: 97.4 F | HEART RATE: 89 BPM | OXYGEN SATURATION: 97 % | WEIGHT: 179 LBS | SYSTOLIC BLOOD PRESSURE: 126 MMHG | HEIGHT: 72 IN

## 2024-03-19 DIAGNOSIS — R68.82 LOW LIBIDO: Primary | ICD-10-CM

## 2024-03-19 DIAGNOSIS — R53.83 FATIGUE, UNSPECIFIED TYPE: ICD-10-CM

## 2024-03-19 PROCEDURE — G8427 DOCREV CUR MEDS BY ELIG CLIN: HCPCS | Performed by: NURSE PRACTITIONER

## 2024-03-19 PROCEDURE — 4004F PT TOBACCO SCREEN RCVD TLK: CPT | Performed by: NURSE PRACTITIONER

## 2024-03-19 PROCEDURE — G8420 CALC BMI NORM PARAMETERS: HCPCS | Performed by: NURSE PRACTITIONER

## 2024-03-19 PROCEDURE — G8484 FLU IMMUNIZE NO ADMIN: HCPCS | Performed by: NURSE PRACTITIONER

## 2024-03-19 PROCEDURE — 99212 OFFICE O/P EST SF 10 MIN: CPT | Performed by: NURSE PRACTITIONER

## 2024-03-19 ASSESSMENT — ENCOUNTER SYMPTOMS
WHEEZING: 0
SHORTNESS OF BREATH: 0
COUGH: 0

## 2024-03-19 NOTE — PROGRESS NOTES
OFFICE PROGRESS NOTE  [facility] "[employer] [facility]  [address] [place] [address]  Dept: [phone]   Chief Complaint   Patient presents with    Discuss Labs    Health Maintenance     Due for flu, heb b, varicella, pneu       ASSESSMENT/PLAN   1. Low libido  Assessment & Plan:  No change labs unremarkable referral to Urology  Orders:  -     Danis Salazar MD, Urology, [place]  2. Fatigue, unspecified type  Assessment & Plan:   Slightly improved labs unremarkable, referral to sleep medicine.   Orders:  -     Alex Young MD, Sleep Medicine, [place]       Reviewed labs: CMP, CBCD,  TSH, FT4, FSH, LH, A1C, Testosterone Free and total.   Reviewed notes from   Reviewed radiology         Return in about 5 months (around 8/19/2024) for follow up from urology and sleep.     HPI:   Here today for follow up on fatigue which is a little bit better, does nap at home thinks maybe from bored, still  concentration and focus problems, he does have hx of ADHD problems as a child. Work is slow. He saw psychiatry in the past and was on medications he didn't like it so he stopped taking it. Felt it didn't help him last year.     He would like to see urology to be sure he isn't having a problem, he has the desire but performance isn't always normal at times. He isn't sure if he may have ED doesn't have a problem with erection but sometimes will ejaculate prematurely.     Lab Results   Component Value Date     02/28/2024    K 4.0 02/28/2024     02/28/2024    CO2 26 02/28/2024    BUN 15 02/28/2024    CREATININE 0.9 02/28/2024    GLUCOSE 94 02/28/2024    CALCIUM 9.1 02/28/2024    PROT 7.3 02/28/2024    LABALBU 4.3 02/28/2024    BILITOT 0.5 02/28/2024    ALKPHOS 78 02/28/2024    AST 20 02/28/2024    ALT 28 02/28/2024    LABGLOM >60 02/28/2024    GFRAA >60 07/26/2022       Lab Results   Component Value Date    WBC 7.9 02/28/2024    HGB 15.9 02/28/2024    HCT 45.9

## 2024-08-16 SDOH — ECONOMIC STABILITY: FOOD INSECURITY: WITHIN THE PAST 12 MONTHS, YOU WORRIED THAT YOUR FOOD WOULD RUN OUT BEFORE YOU GOT MONEY TO BUY MORE.: NEVER TRUE

## 2024-08-16 SDOH — ECONOMIC STABILITY: INCOME INSECURITY: HOW HARD IS IT FOR YOU TO PAY FOR THE VERY BASICS LIKE FOOD, HOUSING, MEDICAL CARE, AND HEATING?: SOMEWHAT HARD

## 2024-08-16 SDOH — ECONOMIC STABILITY: FOOD INSECURITY: WITHIN THE PAST 12 MONTHS, THE FOOD YOU BOUGHT JUST DIDN'T LAST AND YOU DIDN'T HAVE MONEY TO GET MORE.: NEVER TRUE

## 2024-08-16 SDOH — ECONOMIC STABILITY: TRANSPORTATION INSECURITY
IN THE PAST 12 MONTHS, HAS LACK OF TRANSPORTATION KEPT YOU FROM MEETINGS, WORK, OR FROM GETTING THINGS NEEDED FOR DAILY LIVING?: NO

## 2024-08-19 ENCOUNTER — OFFICE VISIT (OUTPATIENT)
Dept: FAMILY MEDICINE CLINIC | Age: 44
End: 2024-08-19
Payer: COMMERCIAL

## 2024-08-19 VITALS
RESPIRATION RATE: 18 BRPM | SYSTOLIC BLOOD PRESSURE: 124 MMHG | TEMPERATURE: 97.2 F | HEART RATE: 68 BPM | BODY MASS INDEX: 23.16 KG/M2 | OXYGEN SATURATION: 97 % | HEIGHT: 72 IN | DIASTOLIC BLOOD PRESSURE: 64 MMHG | WEIGHT: 171 LBS

## 2024-08-19 DIAGNOSIS — R68.82 LOW LIBIDO: Primary | ICD-10-CM

## 2024-08-19 DIAGNOSIS — G47.09 OTHER INSOMNIA: ICD-10-CM

## 2024-08-19 DIAGNOSIS — R53.83 FATIGUE, UNSPECIFIED TYPE: ICD-10-CM

## 2024-08-19 PROBLEM — R10.31 RLQ ABDOMINAL PAIN: Status: RESOLVED | Noted: 2022-07-28 | Resolved: 2024-08-19

## 2024-08-19 PROBLEM — R10.13 EPIGASTRIC PAIN: Status: RESOLVED | Noted: 2022-07-28 | Resolved: 2024-08-19

## 2024-08-19 PROCEDURE — 99213 OFFICE O/P EST LOW 20 MIN: CPT | Performed by: NURSE PRACTITIONER

## 2024-08-19 PROCEDURE — G8427 DOCREV CUR MEDS BY ELIG CLIN: HCPCS | Performed by: NURSE PRACTITIONER

## 2024-08-19 PROCEDURE — G8420 CALC BMI NORM PARAMETERS: HCPCS | Performed by: NURSE PRACTITIONER

## 2024-08-19 PROCEDURE — 4004F PT TOBACCO SCREEN RCVD TLK: CPT | Performed by: NURSE PRACTITIONER

## 2024-08-19 ASSESSMENT — ENCOUNTER SYMPTOMS
WHEEZING: 0
COUGH: 0
SHORTNESS OF BREATH: 0

## 2024-08-19 NOTE — ASSESSMENT & PLAN NOTE
Labs were unremarkable from February he missed the sleep medicine and had to reschedule the appt.

## 2024-08-19 NOTE — PATIENT INSTRUCTIONS
drivers license) and disabled (physician form and pt form)    Cost $2 each way for medical (can go 30 miles from residence) and non-medical (12 miles from residence)  Phone:  [phone]                                [facility] HEALTHY AGING PROGRAM  What they offer: Transportation to medical appointments for [facility] residents for doctor appointments only; requires 2 business day notice      No cost   May be limited due to funding  Phone:  [phone] ([place] [facility])                             INDIVIDUALS WITH MEDICAID MANAGED CARE PLANS ([employer], [employer], [employer], [employer], [employer], [employer], [facility]): call the member services phone number on your medical card and request transportation at least 3 days in advance                           INDIVIDUALS WITH SOME MEDICARE ADVANTAGE PLANS OR MEDICARE AND MEDICAID PLANS ALSO HAVE TRANSPORTATION AVAILABLE: CALL THE MEMBER SERVICES NUMBER ON YOUR INSURANCE CARD AND INQUIRE IF HAVE TRANSPORTATION BENEFIT        [facility] Food Resources*  (Call United Way/211 for more resources)         HELP NETWORK [facility]:  What they do: Provides 24-hr, 7 days a week access to information on community resources for food & clothing help for [facility] AND [facility]  Phone: 211 or 530-291-6335  Text “HELP NETWORK” to 367765 for local food resources  DEPARTMENT OF JOB AND FAMILY SERVICES:  What they do: SNAP, provide a card to use like cash to purchase healthy foods at approved retailers  [facility]  Phone: [phone]  [facility]  Phone: [phone]  [facility]  Phone: [phone]  Website: [place].gov  [facility]:  [address] [place] [address]  What they offer: Food and clothing distribution on Tuesdays from 9 am to 12pm & Thursdays from 4pm to 7pm.   Phone Number: [phone] ext. 503  Website:

## 2024-08-19 NOTE — PROGRESS NOTES
OFFICE PROGRESS NOTE  [facility] [place] [facility]  [address] [place] [address]  Dept: [phone]   Chief Complaint   Patient presents with    Referral - General     Follow up-low libido    Sleep Problem       ASSESSMENT/PLAN   1. Low libido  Assessment & Plan:  Labs unremarkable, saw urology and told him to change his diet naturally to increase his libido.   2. Fatigue, unspecified type  Assessment & Plan:  Labs were unremarkable from February he missed the sleep medicine and had to reschedule the appt.   3. Other insomnia  Assessment & Plan:   Has appt with sleep medicine on Wednesday       Reviewed labs: labs in feb were unremarkable.   Reviewed notes from from urology 7/17/24 with patient  Reviewed radiology     Discussed smoking cessation and Discussed taking medications as directed and adverse effects    Return in about 3 months (around 11/19/2024) for est care Dr Gao.     HPI:   Here today for follow up on low libido and fatigue, he saw urology and felt there was nothing wrong with him and his testosterone levels were normal. He does have appt with sleep medicine on Wednesday for further evaluation. He says he has changed his diet but nothing has made a difference, he cut back on the medical marijuana and only using it at night before bed says he was up at 3 am this morning and then back up at 6 or 7 am. Things at home are good states they aren't arguing every day. I'm trying to down size and get rid of some dogs. He feels he isn't depressed,I'm actually happy, anxiety is a little high sometimes. He says if he doesn't use the marijuana he just can't sleep. He denies any bipolar tendencies. I have been doing a lot over the last 6 months to sell it, was denied a home equity.     Current Outpatient Medications:     HYDROcodone-acetaminophen (NORCO)  MG per tablet, TAKE 1 TABLET BY MOUTH FOUR TIMES A DAY, Disp: , Rfl:       Surgical History:  has no past

## 2024-08-21 ENCOUNTER — OFFICE VISIT (OUTPATIENT)
Dept: SLEEP CENTER | Age: 44
End: 2024-08-21
Payer: COMMERCIAL

## 2024-08-21 VITALS
HEART RATE: 73 BPM | RESPIRATION RATE: 14 BRPM | OXYGEN SATURATION: 96 % | DIASTOLIC BLOOD PRESSURE: 75 MMHG | HEIGHT: 72 IN | SYSTOLIC BLOOD PRESSURE: 118 MMHG | BODY MASS INDEX: 23.16 KG/M2 | WEIGHT: 171 LBS

## 2024-08-21 DIAGNOSIS — F11.90 CHRONIC, CONTINUOUS USE OF OPIOIDS: ICD-10-CM

## 2024-08-21 DIAGNOSIS — G47.19 EXCESSIVE DAYTIME SLEEPINESS: Primary | ICD-10-CM

## 2024-08-21 DIAGNOSIS — G47.33 OSA (OBSTRUCTIVE SLEEP APNEA): ICD-10-CM

## 2024-08-21 PROCEDURE — 99204 OFFICE O/P NEW MOD 45 MIN: CPT | Performed by: STUDENT IN AN ORGANIZED HEALTH CARE EDUCATION/TRAINING PROGRAM

## 2024-08-21 PROCEDURE — G8420 CALC BMI NORM PARAMETERS: HCPCS | Performed by: STUDENT IN AN ORGANIZED HEALTH CARE EDUCATION/TRAINING PROGRAM

## 2024-08-21 PROCEDURE — G8427 DOCREV CUR MEDS BY ELIG CLIN: HCPCS | Performed by: STUDENT IN AN ORGANIZED HEALTH CARE EDUCATION/TRAINING PROGRAM

## 2024-08-21 PROCEDURE — 4004F PT TOBACCO SCREEN RCVD TLK: CPT | Performed by: STUDENT IN AN ORGANIZED HEALTH CARE EDUCATION/TRAINING PROGRAM

## 2024-08-21 ASSESSMENT — SLEEP AND FATIGUE QUESTIONNAIRES
HOW LIKELY ARE YOU TO NOD OFF OR FALL ASLEEP WHILE WATCHING TV: MODERATE CHANCE OF DOZING
HOW LIKELY ARE YOU TO NOD OFF OR FALL ASLEEP WHILE SITTING AND TALKING TO SOMEONE: SLIGHT CHANCE OF DOZING
HOW LIKELY ARE YOU TO NOD OFF OR FALL ASLEEP WHILE SITTING QUIETLY AFTER LUNCH WITHOUT ALCOHOL: SLIGHT CHANCE OF DOZING
HOW LIKELY ARE YOU TO NOD OFF OR FALL ASLEEP WHEN YOU ARE A PASSENGER IN A CAR FOR AN HOUR WITHOUT A BREAK: SLIGHT CHANCE OF DOZING
HOW LIKELY ARE YOU TO NOD OFF OR FALL ASLEEP IN A CAR, WHILE STOPPED FOR A FEW MINUTES IN TRAFFIC: WOULD NEVER DOZE
HOW LIKELY ARE YOU TO NOD OFF OR FALL ASLEEP WHILE SITTING INACTIVE IN A PUBLIC PLACE: SLIGHT CHANCE OF DOZING
ESS TOTAL SCORE: 10
HOW LIKELY ARE YOU TO NOD OFF OR FALL ASLEEP WHILE LYING DOWN TO REST IN THE AFTERNOON WHEN CIRCUMSTANCES PERMIT: MODERATE CHANCE OF DOZING
HOW LIKELY ARE YOU TO NOD OFF OR FALL ASLEEP WHILE SITTING AND READING: MODERATE CHANCE OF DOZING

## 2024-08-21 NOTE — PROGRESS NOTES
[facility] Sleep Medicine    Patient Name: Jared Julio  Age: 44 y.o.   : 1980  Date of Visit: 24    Assessment and Plan:     1. Excessive daytime sleepiness  2. SHER (obstructive sleep apnea)  3. Chronic, continuous use of opioids  - In lab PSG required to rule out central sleep apnea given chronic opioid use    History:    HPI   Jared Julio is a 44 y.o. male with  has a past medical history of Arm pain, H/O cardiovascular stress test (03/10/2023), Hepatitis, History of Holter monitoring (2023), Intussusception (HCC) (2022), Neck pain, Osteoarthritis of spine with radiculopathy, cervical region (2016), and Shoulder pain. who presents as a new patient to Sleep Clinic, referred by Dr. Ortega, for Fatigue    - Snoring, feels chronically fatigued  - Has been on Norco for chronic pain  - Had been on tramadol for 5-6 years  - Unclear if witnessed apneas or choking/gasping  - No RLS/bruxism      PMH:  Past Medical History:   Diagnosis Date    Arm pain     arm pain and numbness    H/O cardiovascular stress test 03/10/2023    Exercise Treadmill Stress    Hepatitis     History of Holter monitoring 2023    Intussusception (HCC) 2022    Neck pain     Osteoarthritis of spine with radiculopathy, cervical region 2016    Shoulder pain         PSH:  No past surgical history on file.     Soc Hx:  Social History     Tobacco Use    Smoking status: Every Day     Current packs/day: 1.00     Types: Cigarettes    Smokeless tobacco: Former   Vaping Use    Vaping status: Every Day    Substances: THC   Substance Use Topics    Alcohol use: Yes     Comment: rarely. drinks 5 to 6 red bulls a day. about 4 coke daily.    Drug use: Yes     Frequency: 7.0 times per week     Types: Marijuana (Weed)     Comment: medical        Fam Hx:  Family History   Problem Relation Age of Onset    Diabetes Mother     High Blood Pressure Mother     Heart Disease Father         Current Outpatient

## 2024-09-08 ENCOUNTER — TELEPHONE (OUTPATIENT)
Dept: SLEEP CENTER | Age: 44
End: 2024-09-08

## 2024-09-09 ENCOUNTER — HOSPITAL ENCOUNTER (OUTPATIENT)
Dept: SLEEP CENTER | Age: 44
Discharge: HOME | End: 2024-09-09
Payer: COMMERCIAL

## 2024-09-09 DIAGNOSIS — G47.33 OSA (OBSTRUCTIVE SLEEP APNEA): ICD-10-CM

## 2024-09-09 DIAGNOSIS — G47.19 EXCESSIVE DAYTIME SLEEPINESS: ICD-10-CM

## 2024-09-09 PROCEDURE — 95810 POLYSOM 6/> YRS 4/> PARAM: CPT

## 2024-10-16 ENCOUNTER — TELEPHONE (OUTPATIENT)
Dept: SLEEP MEDICINE | Age: 44
End: 2024-10-16

## 2024-10-16 NOTE — TELEPHONE ENCOUNTER
----- Message from Dr. Alex Lee MD sent at 10/12/2024 12:00 PM EDT -----  Results to be discussed with patient - no SHER. F-U Prn.

## 2024-10-16 NOTE — TELEPHONE ENCOUNTER
Call to patient, lvm regarding negative sleep study and follow up as needed. Left call back number if he wanted to schedule.

## 2025-07-08 ENCOUNTER — APPOINTMENT (OUTPATIENT)
Dept: GENERAL RADIOLOGY | Age: 45
End: 2025-07-08
Payer: COMMERCIAL

## 2025-07-08 ENCOUNTER — HOSPITAL ENCOUNTER (EMERGENCY)
Age: 45
Discharge: HOME | End: 2025-07-09
Attending: EMERGENCY MEDICINE
Payer: COMMERCIAL

## 2025-07-08 DIAGNOSIS — S93.401A MODERATE RIGHT ANKLE SPRAIN, INITIAL ENCOUNTER: Primary | ICD-10-CM

## 2025-07-08 PROCEDURE — 73610 X-RAY EXAM OF ANKLE: CPT

## 2025-07-08 PROCEDURE — 99283 EMERGENCY DEPT VISIT LOW MDM: CPT

## 2025-07-09 VITALS
HEART RATE: 76 BPM | RESPIRATION RATE: 18 BRPM | SYSTOLIC BLOOD PRESSURE: 118 MMHG | DIASTOLIC BLOOD PRESSURE: 74 MMHG | TEMPERATURE: 97.4 F | OXYGEN SATURATION: 100 %

## 2025-07-09 NOTE — ED PROVIDER NOTES
Independent JONNATHAN Visit.         [facility] EMERGENCY DEPARTMENT  ED  Encounter Note  Admit Date/RoomTime: 2025  9:59 PM  ED Room: Internal Waiting A/IntWa*  NAME: Jared Julio  : 1980  MRN: [id number]  PCP: Easton Cooper MD    CHIEF COMPLAINT     Ankle Pain (Stepped into a hole in backyard and twisted right ankle - bruising to outside of foot/ankle. Patient is able to put weight on foot. - hit head, -LOC, - thinners.)    HISTORY OF PRESENT ILLNESS        Jared Julio is a 45 y.o. male, with a past medical hx of  has a past medical history of Arm pain, H/O cardiovascular stress test, Hepatitis, History of Holter monitoring, Intussusception (HCC), Neck pain, Osteoarthritis of spine with radiculopathy, cervical region, and Shoulder pain. who presents to the ED by private vehicle for evaluation of right ankle injury with associated bruising, swelling, and pain, that occurred earlier today.  The complaint has been persistent and are moderate in severity.  Patient reports that he was in his yard, was walking through the and stepped in a hole causing an inversion injury to his right ankle.  Initially started off with some mild bruising and swelling to his right lateral ankle but has now progressed to both sides of his ankle.  Here admits that he has been putting weight on it and walking on it all day.  Due to his persistent pain and swelling prompting come into the ED for evaluation.  Denies falling to the ground, hitting his head, numbness, tingling, weakness, or having any other symptoms or injuries.  Denies any previous surgical history to this right ankle.    REVIEW OF SYSTEMS     Pertinent positives and negatives are stated within HPI, all other systems reviewed and are negative.    Past Medical History:  has a past medical history of Arm pain, H/O cardiovascular stress test, Hepatitis, History of Holter monitoring, Intussusception (HCC), Neck pain, Osteoarthritis of spine

## 2025-07-09 NOTE — DISCHARGE INSTRUCTIONS
Thank you for attending [facility] Emergency Department.  There are a few things you need to be reminded about upon discharge:    As we discussed, your x-rays were negative for any fractures or dislocations.  Likely an ankle sprain.  I recommend RICE therapy, Tylenol and ibuprofen.  I also recommend following up with Dr. Murillo, podiatry for further evaluation of your injury given your extensive bruising.  Please follow-up with your PCP as needed if for what ever reason you develop worsening symptoms, such as worsening pain or swelling, please do not hesitate to return to the emergency department immediately for further evaluation.

## 2025-08-21 ENCOUNTER — HOSPITAL ENCOUNTER (OUTPATIENT)
Dept: LAB | Age: 45
Discharge: HOME | End: 2025-08-21
Payer: COMMERCIAL

## 2025-08-21 LAB
CRP SERPL HS-MCNC: <3 MG/L (ref 0–5)
ERYTHROCYTE [SEDIMENTATION RATE] IN BLOOD BY WESTERGREN METHOD: 4 MM/HR (ref 0–15)

## 2025-08-21 PROCEDURE — 86038 ANTINUCLEAR ANTIBODIES: CPT

## 2025-08-21 PROCEDURE — 36415 COLL VENOUS BLD VENIPUNCTURE: CPT

## 2025-08-21 PROCEDURE — 86140 C-REACTIVE PROTEIN: CPT

## 2025-08-21 PROCEDURE — 86431 RHEUMATOID FACTOR QUANT: CPT

## 2025-08-21 PROCEDURE — 83516 IMMUNOASSAY NONANTIBODY: CPT

## 2025-08-21 PROCEDURE — 86039 ANTINUCLEAR ANTIBODIES (ANA): CPT

## 2025-08-21 PROCEDURE — 85652 RBC SED RATE AUTOMATED: CPT

## 2025-08-22 LAB
ANA SER QL IA: NEGATIVE
RHEUMATOID FACT SER NEPH-ACNC: <10 IU/ML (ref 0–14)